# Patient Record
Sex: FEMALE | Race: WHITE | NOT HISPANIC OR LATINO
[De-identification: names, ages, dates, MRNs, and addresses within clinical notes are randomized per-mention and may not be internally consistent; named-entity substitution may affect disease eponyms.]

---

## 2019-03-15 ENCOUNTER — APPOINTMENT (OUTPATIENT)
Dept: COLORECTAL SURGERY | Facility: CLINIC | Age: 29
End: 2019-03-15
Payer: COMMERCIAL

## 2019-03-15 VITALS
DIASTOLIC BLOOD PRESSURE: 96 MMHG | HEART RATE: 89 BPM | OXYGEN SATURATION: 99 % | TEMPERATURE: 98.6 F | SYSTOLIC BLOOD PRESSURE: 149 MMHG | WEIGHT: 143 LBS | HEIGHT: 62 IN | BODY MASS INDEX: 26.31 KG/M2

## 2019-03-15 DIAGNOSIS — K64.4 RESIDUAL HEMORRHOIDAL SKIN TAGS: ICD-10-CM

## 2019-03-15 DIAGNOSIS — Z78.9 OTHER SPECIFIED HEALTH STATUS: ICD-10-CM

## 2019-03-15 PROCEDURE — 46600 DIAGNOSTIC ANOSCOPY SPX: CPT

## 2019-03-15 PROCEDURE — 99243 OFF/OP CNSLTJ NEW/EST LOW 30: CPT | Mod: 25

## 2019-03-16 PROBLEM — K64.4 EXTERNAL HEMORRHOID: Status: ACTIVE | Noted: 2019-03-16

## 2019-03-16 PROBLEM — Z78.9 SOCIAL ALCOHOL USE: Status: ACTIVE | Noted: 2019-03-16

## 2019-03-16 PROBLEM — Z78.9 NON-SMOKER: Status: ACTIVE | Noted: 2019-03-16

## 2019-03-16 RX ORDER — CICLOPIROX 80 MG/ML
8 SOLUTION TOPICAL
Qty: 7 | Refills: 0 | Status: COMPLETED | COMMUNITY
Start: 2019-02-26

## 2019-03-16 NOTE — PHYSICAL EXAM
[Normal] : was normal [None] : there was no rectal mass  [Respiratory Effort] : normal respiratory effort [Normal Rate and Rhythm] : normal rate and rhythm [Calm] : calm [Excoriation] : no perianal excoriation [Gross Blood] : no gross blood [de-identified] : soft, non tender,  non distended. No mass or hernia appreciated [de-identified] : grade 1 internal hemorrhoids [de-identified] : small anterior external hemorrhoid tag [de-identified] : s [de-identified] : well appearing, in no distress [de-identified] : normocephalic, atraumatic [de-identified] : moves extremities without difficulty [de-identified] : warm and dry [de-identified] : alert and oriented x 3

## 2019-03-16 NOTE — HISTORY OF PRESENT ILLNESS
[FreeTextEntry1] : 28 year old female who presents for consultation for hemorrhoids. She has a history of IBS and has alternating constipation and diarrhea. She reports a lump in the perianal area which has been painful especially with loose stools. No current rectal bleeding although she had that in the past.

## 2019-03-16 NOTE — CONSULT LETTER
[Dear  ___] : Dear  [unfilled], [Consult Letter:] : I had the pleasure of evaluating your patient, [unfilled]. [Please see my note below.] : Please see my note below. [Consult Closing:] : Thank you very much for allowing me to participate in the care of this patient.  If you have any questions, please do not hesitate to contact me. [Sincerely,] : Sincerely, [FreeTextEntry3] : Chris Weston MD\par

## 2019-10-04 ENCOUNTER — TRANSCRIPTION ENCOUNTER (OUTPATIENT)
Age: 29
End: 2019-10-04

## 2019-10-23 ENCOUNTER — APPOINTMENT (OUTPATIENT)
Dept: RHEUMATOLOGY | Facility: CLINIC | Age: 29
End: 2019-10-23
Payer: COMMERCIAL

## 2019-10-23 VITALS
RESPIRATION RATE: 14 BRPM | WEIGHT: 137 LBS | DIASTOLIC BLOOD PRESSURE: 90 MMHG | HEIGHT: 62 IN | SYSTOLIC BLOOD PRESSURE: 156 MMHG | BODY MASS INDEX: 25.21 KG/M2 | HEART RATE: 88 BPM | TEMPERATURE: 98.6 F

## 2019-10-23 DIAGNOSIS — Z87.09 PERSONAL HISTORY OF OTHER DISEASES OF THE RESPIRATORY SYSTEM: ICD-10-CM

## 2019-10-23 DIAGNOSIS — R05 COUGH: ICD-10-CM

## 2019-10-23 DIAGNOSIS — R09.81 NASAL CONGESTION: ICD-10-CM

## 2019-10-23 PROCEDURE — 99204 OFFICE O/P NEW MOD 45 MIN: CPT

## 2019-10-24 ENCOUNTER — TRANSCRIPTION ENCOUNTER (OUTPATIENT)
Age: 29
End: 2019-10-24

## 2019-10-28 NOTE — CONSULT LETTER
[Dear  ___] : Dear  [unfilled], [Consult Letter:] : I had the pleasure of evaluating your patient, [unfilled]. [Please see my note below.] : Please see my note below. [Consult Closing:] : Thank you very much for allowing me to participate in the care of this patient.  If you have any questions, please do not hesitate to contact me. [Sincerely,] : Sincerely, [FreeTextEntry2] : Rox Weston [FreeTextEntry3] : Betty Marie MD\par Rheumatology\par [facility] \par \par [address] [place] [address]\par P: [phone]\par F: [phone]\par

## 2019-10-28 NOTE — PHYSICAL EXAM
[General Appearance - Alert] : alert [General Appearance - In No Acute Distress] : in no acute distress [General Appearance - Well Nourished] : well nourished [Sclera] : the sclera and conjunctiva were normal [PERRL With Normal Accommodation] : pupils were equal in size, round, and reactive to light [Extraocular Movements] : extraocular movements were intact [Outer Ear] : the ears and nose were normal in appearance [Both Tympanic Membranes Were Examined] : both tympanic membranes were normal [Nasal Cavity] : the nasal mucosa and septum were normal [Oropharynx] : the oropharynx was normal [Neck Appearance] : the appearance of the neck was normal [Neck Cervical Mass (___cm)] : no neck mass was observed [Thyroid Diffuse Enlargement] : the thyroid was not enlarged [Auscultation Breath Sounds / Voice Sounds] : lungs were clear to auscultation bilaterally [Heart Rate And Rhythm] : heart rate was normal and rhythm regular [Heart Sounds] : normal S1 and S2 [Heart Sounds Gallop] : no gallops [Murmurs] : no murmurs [Heart Sounds Pericardial Friction Rub] : no pericardial rub [Edema] : there was no peripheral edema [Bowel Sounds] : normal bowel sounds [Abdomen Soft] : soft [Abdomen Tenderness] : non-tender [Cervical Lymph Nodes Enlarged Posterior Bilaterally] : posterior cervical [Cervical Lymph Nodes Enlarged Anterior Bilaterally] : anterior cervical [Supraclavicular Lymph Nodes Enlarged Bilaterally] : supraclavicular [No CVA Tenderness] : no ~M costovertebral angle tenderness [No Spinal Tenderness] : no spinal tenderness [Abnormal Walk] : normal gait [Nail Clubbing] : no clubbing  or cyanosis of the fingernails [Musculoskeletal - Swelling] : no joint swelling seen [Motor Tone] : muscle strength and tone were normal [Skin Color & Pigmentation] : normal skin color and pigmentation [Skin Turgor] : normal skin turgor [] : no rash [Motor Exam] : the motor exam was normal [No Focal Deficits] : no focal deficits [Oriented To Time, Place, And Person] : oriented to person, place, and time [Impaired Insight] : insight and judgment were intact [Affect] : the affect was normal [FreeTextEntry1] : No inflammation in nose, ears, posterior pharynx, MMM, no oral ulcers

## 2019-10-28 NOTE — CONSULT LETTER
[Dear  ___] : Dear  [unfilled], [Consult Letter:] : I had the pleasure of evaluating your patient, [unfilled]. [Please see my note below.] : Please see my note below. [Consult Closing:] : Thank you very much for allowing me to participate in the care of this patient.  If you have any questions, please do not hesitate to contact me. [Sincerely,] : Sincerely, [FreeTextEntry2] : Rox Weston [FreeTextEntry3] : Betty Marie MD\par Rheumatology\par [facility] \par \par [address] [place] [address]\par P: [phone]\par F: [phone]\par

## 2019-10-28 NOTE — REASON FOR VISIT
"Called to check on pt. Pt states she is experiencing dizziness, lightheadedness, weakness (unable to walk a long distance), temperature went from 99.5 to 98.0, pt is home alone. Per the pt "it feels like veritgo, just don't have the ringing in ears". Pt advised she will need to go to the ED to be further evaluated or she can be seen today in clinic at the main location. Pt states she will make arrangements and inform clinic.   " [Initial Evaluation] : an initial evaluation [FreeTextEntry1] : cotton wool spot on L retina

## 2019-10-28 NOTE — ASSESSMENT
[FreeTextEntry1] : LIVIER PATTEN is a 29 year old woman who presents with solitary cotton wool spot in L eye in setting of chronic sinus issues over same time course but without prior sinus, lung or renal issues. Initial inflammatory w/u negative, visual disturbance seems to be slowly self-resolving, elevated BP at optho and again there which does contribute to eye findings. Additionally new tattoo placed shortly before this. \par \par - continue workup -- ANCA, ACE, centromere, CCP, SPEP/UPEP, Lyme, HLA B27, EBV, HIV, IgG4, Igs, sjogrens\par - check Quantiferon and CXR given prior LTBI\par - check blood cx and ECHO given recent tattoo as possible emboli phenomenon 2/2 vegetations\par - advised PMD visit to optimize BP \par - optho f/u \par - RTC 1 month

## 2019-10-28 NOTE — HISTORY OF PRESENT ILLNESS
[FreeTextEntry1] : LIVIER PATTEN is a 29 year old woman who presents with solitary cotton wool spot in L eye x 1 month. Spontaneous onset on 9/22/19, which manifested as crescent area of bright light in lower visual field, followed by migraine the next day which resolved but the visual disturbance persisted. Does not have hx of chronic migraines. Saw optho and GP on 9/23/19 and had b/w, f/u with optho again on 10/3/19 for angiogram which was unrevealing, not given any eye drops, next f/u on 11/7. Over same time course has had nonproductive cough, fullness in ears, sinus congestion, sore throat but no F/C or purulent discharge. Initially taking claritin, given medrol dose pack on 10/5 without improvement, followed by cedinir x 7days without improvement. \par \par No chronic sinus issues, no epistaxis, no ocular pressure, no scalp tenderness, no rashes, no dysuria, hematuria, decreased urine, unintentional weight loss approx 8lbs over ?9 months, no change in appetite, No paresthesias, no muscle weakness, F/C, no night sweats. No CP/SOB since cough improved, green sputum at the time. No sick contacts. \par \par SLE ROS negative for alopecia, sicca, salivary gland swelling, oral ulcers, malar rash, photosensitivity, SOB, chest pain, serositis, abd pain, dysuria, hematuria, rash, joint AM stiffness/synovitis, hematologic abnormalities, Raynauds. No pregnancies, no blood clots. \par \par Chronic easy bruising, no blood clots, breakthru bleeding in sept, no change to OCPs, LMP otherwise regular. 2014 -- PPD +, CXR normal, INH x 9 months completed for LTBI -- [place], no awareness of exposure. Reports most recent tattoo shortly before onset of symptoms, not infected, healed well. \par \par Labs: LINDA/SLE w/u negative

## 2019-10-28 NOTE — REVIEW OF SYSTEMS
[Feeling Tired] : feeling tired [Recent Weight Loss (___ Lbs)] : recent [unfilled] ~Ulb weight loss [Earache] : earache [Nasal Discharge] : nasal discharge [Sore Throat] : sore throat [Negative] : Heme/Lymph [As Noted in HPI] : as noted in HPI [Loss Of Hearing] : no hearing loss [Nosebleeds] : no nosebleeds [Hoarseness] : no hoarseness

## 2019-10-28 NOTE — CONSULT LETTER
[Dear  ___] : Dear  [unfilled], [Consult Letter:] : I had the pleasure of evaluating your patient, [unfilled]. [Please see my note below.] : Please see my note below. [Consult Closing:] : Thank you very much for allowing me to participate in the care of this patient.  If you have any questions, please do not hesitate to contact me. [Sincerely,] : Sincerely, [FreeTextEntry2] : Rox Weston [FreeTextEntry3] : Betty Marie MD\par Rheumatology\par [facility] \par \par [address] [place] [address]\par P: [phone]\par F: [phone]\par

## 2019-10-28 NOTE — PHYSICAL EXAM
[General Appearance - Alert] : alert [General Appearance - Well Nourished] : well nourished [General Appearance - In No Acute Distress] : in no acute distress [Sclera] : the sclera and conjunctiva were normal [PERRL With Normal Accommodation] : pupils were equal in size, round, and reactive to light [Extraocular Movements] : extraocular movements were intact [Outer Ear] : the ears and nose were normal in appearance [Both Tympanic Membranes Were Examined] : both tympanic membranes were normal [Nasal Cavity] : the nasal mucosa and septum were normal [Oropharynx] : the oropharynx was normal [Neck Appearance] : the appearance of the neck was normal [Neck Cervical Mass (___cm)] : no neck mass was observed [Thyroid Diffuse Enlargement] : the thyroid was not enlarged [Auscultation Breath Sounds / Voice Sounds] : lungs were clear to auscultation bilaterally [Heart Rate And Rhythm] : heart rate was normal and rhythm regular [Heart Sounds] : normal S1 and S2 [Heart Sounds Gallop] : no gallops [Murmurs] : no murmurs [Heart Sounds Pericardial Friction Rub] : no pericardial rub [Edema] : there was no peripheral edema [Bowel Sounds] : normal bowel sounds [Abdomen Soft] : soft [Abdomen Tenderness] : non-tender [Cervical Lymph Nodes Enlarged Posterior Bilaterally] : posterior cervical [Cervical Lymph Nodes Enlarged Anterior Bilaterally] : anterior cervical [Supraclavicular Lymph Nodes Enlarged Bilaterally] : supraclavicular [No CVA Tenderness] : no ~M costovertebral angle tenderness [No Spinal Tenderness] : no spinal tenderness [Abnormal Walk] : normal gait [Nail Clubbing] : no clubbing  or cyanosis of the fingernails [Musculoskeletal - Swelling] : no joint swelling seen [Motor Tone] : muscle strength and tone were normal [Skin Color & Pigmentation] : normal skin color and pigmentation [Skin Turgor] : normal skin turgor [] : no rash [Motor Exam] : the motor exam was normal [No Focal Deficits] : no focal deficits [Oriented To Time, Place, And Person] : oriented to person, place, and time [Impaired Insight] : insight and judgment were intact [Affect] : the affect was normal [FreeTextEntry1] : No inflammation in nose, ears, posterior pharynx, MMM, no oral ulcers

## 2019-10-28 NOTE — DATA REVIEWED
[FreeTextEntry1] : Available notes, and pertinent labs & imaging in EMR reviewed. Paper records reviewed, scanned to EMR.

## 2019-11-01 ENCOUNTER — TRANSCRIPTION ENCOUNTER (OUTPATIENT)
Age: 29
End: 2019-11-01

## 2019-11-01 ENCOUNTER — RESULT REVIEW (OUTPATIENT)
Age: 29
End: 2019-11-01

## 2019-11-06 ENCOUNTER — TRANSCRIPTION ENCOUNTER (OUTPATIENT)
Age: 29
End: 2019-11-06

## 2019-11-22 ENCOUNTER — APPOINTMENT (OUTPATIENT)
Dept: RHEUMATOLOGY | Facility: CLINIC | Age: 29
End: 2019-11-22
Payer: COMMERCIAL

## 2019-11-22 VITALS — SYSTOLIC BLOOD PRESSURE: 120 MMHG | DIASTOLIC BLOOD PRESSURE: 76 MMHG | HEART RATE: 78 BPM | RESPIRATION RATE: 14 BRPM

## 2019-11-22 PROCEDURE — 99213 OFFICE O/P EST LOW 20 MIN: CPT

## 2019-11-22 NOTE — CONSULT LETTER
[Dear  ___] : Dear  [unfilled], [Consult Letter:] : I had the pleasure of evaluating your patient, [unfilled]. [Please see my note below.] : Please see my note below. [Consult Closing:] : Thank you very much for allowing me to participate in the care of this patient.  If you have any questions, please do not hesitate to contact me. [Sincerely,] : Sincerely, [FreeTextEntry2] : Rox Weston [FreeTextEntry3] : Betty Marie MD\par Rheumatology\par [facility] \par \par [address] [place] [address]\par P: [phone]\par F: [phone]\par   [DrHakeem  ___] : Dr. ABRAHAM

## 2019-11-22 NOTE — ASSESSMENT
[FreeTextEntry1] : LIVIER PATTEN is a 29 year old woman with solitary cotton wool spot in L eye in setting of chronic sinus issues over same time course but without prior sinus, lung or renal issues. Initial inflammatory w/u negative and further testing for inflammatory/rheumatologic etiologies as well as infectious etiologies are all negative. Visual disturbance is slowly self-resolving, no new areas, BP normal today. New Strep infection, now improved. \par \par - discussed all labs/ workup in detail with the patient, no further testing from rheumatologic standpoint at present\par - infectious w/u including CXR, ECHO, Bcx all negative\par - repeat CRP with next routine b/w in Feb 2020 to trend as only lab that was abnormal but this is nonspecific \par - optho f/u \par - RTC prn

## 2019-11-22 NOTE — REVIEW OF SYSTEMS
[As Noted in HPI] : as noted in HPI [Sore Throat] : sore throat [Loss Of Hearing] : no hearing loss [Nosebleeds] : no nosebleeds [Hoarseness] : hoarseness [Negative] : Constitutional

## 2019-11-22 NOTE — HISTORY OF PRESENT ILLNESS
[FreeTextEntry1] : Since last visit, f/u with optho and noted that cotton wool is shrinking, vision has returned to normal, f/u again in Jan 2020. Recent dx of Strep s/p 10 days of Amoxicillin with improvement but with some sore throat/ hoarse voice, but no F/C. No other complaints. \par

## 2020-12-21 PROBLEM — Z87.09 HISTORY OF SORE THROAT: Status: RESOLVED | Noted: 2019-10-23 | Resolved: 2020-12-21

## 2021-01-25 ENCOUNTER — TRANSCRIPTION ENCOUNTER (OUTPATIENT)
Age: 31
End: 2021-01-25

## 2021-02-19 ENCOUNTER — NON-APPOINTMENT (OUTPATIENT)
Age: 31
End: 2021-02-19

## 2021-03-03 ENCOUNTER — NON-APPOINTMENT (OUTPATIENT)
Age: 31
End: 2021-03-03

## 2021-03-03 ENCOUNTER — APPOINTMENT (OUTPATIENT)
Dept: CARDIOLOGY | Facility: CLINIC | Age: 31
End: 2021-03-03
Payer: COMMERCIAL

## 2021-03-03 VITALS
HEIGHT: 62 IN | HEART RATE: 85 BPM | DIASTOLIC BLOOD PRESSURE: 90 MMHG | WEIGHT: 142 LBS | OXYGEN SATURATION: 100 % | SYSTOLIC BLOOD PRESSURE: 140 MMHG | BODY MASS INDEX: 26.13 KG/M2

## 2021-03-03 PROCEDURE — 93000 ELECTROCARDIOGRAM COMPLETE: CPT

## 2021-03-03 PROCEDURE — 99072 ADDL SUPL MATRL&STAF TM PHE: CPT

## 2021-03-03 PROCEDURE — 99244 OFF/OP CNSLTJ NEW/EST MOD 40: CPT | Mod: 25

## 2021-03-03 RX ORDER — OMEPRAZOLE 40 MG/1
40 CAPSULE, DELAYED RELEASE ORAL
Qty: 30 | Refills: 0 | Status: DISCONTINUED | COMMUNITY
Start: 2019-01-21 | End: 2021-03-03

## 2021-03-10 ENCOUNTER — NON-APPOINTMENT (OUTPATIENT)
Age: 31
End: 2021-03-10

## 2021-03-10 ENCOUNTER — TRANSCRIPTION ENCOUNTER (OUTPATIENT)
Age: 31
End: 2021-03-10

## 2021-03-10 NOTE — HISTORY OF PRESENT ILLNESS
[FreeTextEntry1] : Pt is a 29 y/o F who is referred here today by their PCP for evaluation.  She has noticed that her BP has been elevated.  She has been checking BP at home 120's-150's/.  She has noticed that her BP has been elevated  at [facility] for the past 2 years\par She has been on OCP since age 18.\par She states that she is feeling well and has no cardiac complaints - denies CP, SOB, diaphoresis, palpitations, dizziness, syncope, LE edema, PND, orthopnea, flushing. \par COVID vaccine 02/2021\par \par TTE 10/2019 EF 75%, mild TR\par \par PMH: PCOS, latent TB, cotton wool spot \par PCP PA Nya Verduzco\par gyn Dr Chi\par Family hx: mother HLD\par Smoking status: never\par social ETOH\par no drug use\par Current exercise: 4-5x/week cardio, resistance training\par Daily water intake: 60 oz\par Daily caffeine intake: 1 cup black tea\par OTC medications: probiotic, melatonin\par NKDA\par Previous hospitalizations: none\par 0 children\par LMP 02/2021

## 2021-03-10 NOTE — DISCUSSION/SUMMARY
[FreeTextEntry1] : Pt is a 31 y/o F with elevated BP\par Will check transthoracic echocardiogram to evaluate left ventricular function and assess for any structural abnormalities\par HTN: start norvasc 2.5mg qd\par Advised low salt diet, regular exercise\par Also advised to speak with gyn about OCP as cause of HTN\par check renal US\par Pt will return in 2-3 mnths or sooner as needed but I encouraged communication via phone or portal if necessary. \par The described plan was discussed with the pt.  All questions and concerns were addressed to the best of my knowledge.

## 2021-04-06 ENCOUNTER — TRANSCRIPTION ENCOUNTER (OUTPATIENT)
Age: 31
End: 2021-04-06

## 2021-05-19 ENCOUNTER — APPOINTMENT (OUTPATIENT)
Dept: CARDIOLOGY | Facility: CLINIC | Age: 31
End: 2021-05-19
Payer: COMMERCIAL

## 2021-05-19 VITALS
SYSTOLIC BLOOD PRESSURE: 138 MMHG | BODY MASS INDEX: 26.13 KG/M2 | HEART RATE: 85 BPM | DIASTOLIC BLOOD PRESSURE: 82 MMHG | OXYGEN SATURATION: 100 % | WEIGHT: 142 LBS | HEIGHT: 62 IN

## 2021-05-19 PROCEDURE — 99214 OFFICE O/P EST MOD 30 MIN: CPT

## 2021-05-19 PROCEDURE — 99072 ADDL SUPL MATRL&STAF TM PHE: CPT

## 2021-05-19 RX ORDER — AMLODIPINE BESYLATE 2.5 MG/1
2.5 TABLET ORAL DAILY
Qty: 90 | Refills: 1 | Status: DISCONTINUED | COMMUNITY
Start: 2021-03-03 | End: 2021-05-19

## 2021-05-19 NOTE — PHYSICAL EXAM
[Well Developed] : well developed [Well Nourished] : well nourished [No Acute Distress] : no acute distress [Normal Conjunctiva] : normal conjunctiva [Normal Venous Pressure] : normal venous pressure [No Carotid Bruit] : no carotid bruit [No Murmur] : no murmur [Normal S1, S2] : normal S1, S2 [No Rub] : no rub [No Gallop] : no gallop [Clear Lung Fields] : clear lung fields [Good Air Entry] : good air entry [No Respiratory Distress] : no respiratory distress  [Soft] : abdomen soft [Non Tender] : non-tender [No Masses/organomegaly] : no masses/organomegaly [Normal Bowel Sounds] : normal bowel sounds [Normal Gait] : normal gait [No Edema] : no edema [No Cyanosis] : no cyanosis [No Clubbing] : no clubbing [No Varicosities] : no varicosities [No Rash] : no rash [No Skin Lesions] : no skin lesions [Moves all extremities] : moves all extremities [No Focal Deficits] : no focal deficits [Normal Speech] : normal speech [Alert and Oriented] : alert and oriented [Normal memory] : normal memory

## 2021-05-21 NOTE — DISCUSSION/SUMMARY
[FreeTextEntry1] : Pt is a 29 y/o F with HTN\par \par HTN: \par stop norvasc and start HCTZ\par TTE 10/2019 EF 75%, mild TR\par TTE 04/2021 EF 67%, mild TR\par VINNY 04/2021 no stenosis\par Advised low salt diet, regular exercise\par \par Pt will return in 2-3 mnths or sooner as needed but I encouraged communication via phone or portal if necessary. \par The described plan was discussed with the pt.  All questions and concerns were addressed to the best of my knowledge.

## 2021-05-21 NOTE — HISTORY OF PRESENT ILLNESS
[FreeTextEntry1] : Pt is a 29 y/o F who presents today for f/u.  She has PMH HTN.  She has been on OCP since age 18 - she has taken her last dose yesterday.  She has been taking amlodipine but this is causing constipation\par She states that she is feeling well and has no cardiac complaints - denies CP, SOB, diaphoresis, palpitations, dizziness, syncope, LE edema, PND, orthopnea, flushing. \par COVID vaccine 02/2021\par \par TTE 10/2019 EF 75%, mild TR\par TTE 04/2021 EF 67%, mild TR\par VINNY 04/2021 no stenosis\par \par amlodipine caused constipation\par \par PMH: PCOS, latent TB, cotton wool spot \par PCP PA Nya Verduzco\par gyn Dr Chi\par Family hx: mother HLD\par Smoking status: never\par social ETOH\par no drug use\par Current exercise: 4-5x/week cardio, resistance training\par Daily water intake: 60 oz\par Daily caffeine intake: 1 cup black tea\par OTC medications: probiotic, melatonin\par NKDA\par Previous hospitalizations: none\par 0 children\par LMP 02/2021

## 2021-08-07 PROBLEM — Z82.49 FAMILY HISTORY OF HYPERTENSION: Status: ACTIVE | Noted: 2021-08-07

## 2021-08-07 PROBLEM — Z83.42 FAMILY HISTORY OF HYPERCHOLESTEROLEMIA: Status: ACTIVE | Noted: 2021-08-07

## 2021-08-07 PROBLEM — Z92.89 HISTORY OF POSITIVE PPD: Status: RESOLVED | Noted: 2021-08-07 | Resolved: 2021-08-07

## 2021-08-09 ENCOUNTER — NON-APPOINTMENT (OUTPATIENT)
Age: 31
End: 2021-08-09

## 2021-08-10 ENCOUNTER — LABORATORY RESULT (OUTPATIENT)
Age: 31
End: 2021-08-10

## 2021-08-10 ENCOUNTER — APPOINTMENT (OUTPATIENT)
Dept: FAMILY MEDICINE | Facility: CLINIC | Age: 31
End: 2021-08-10
Payer: COMMERCIAL

## 2021-08-10 VITALS
OXYGEN SATURATION: 99 % | DIASTOLIC BLOOD PRESSURE: 74 MMHG | HEART RATE: 70 BPM | TEMPERATURE: 97.5 F | SYSTOLIC BLOOD PRESSURE: 132 MMHG | BODY MASS INDEX: 27.97 KG/M2 | WEIGHT: 152 LBS | HEIGHT: 62 IN

## 2021-08-10 DIAGNOSIS — Z83.42 FAMILY HISTORY OF FAMILIAL HYPERCHOLESTEROLEMIA: ICD-10-CM

## 2021-08-10 DIAGNOSIS — R20.2 PARESTHESIA OF SKIN: ICD-10-CM

## 2021-08-10 DIAGNOSIS — Z92.89 PERSONAL HISTORY OF OTHER MEDICAL TREATMENT: ICD-10-CM

## 2021-08-10 DIAGNOSIS — Z82.49 FAMILY HISTORY OF ISCHEMIC HEART DISEASE AND OTHER DISEASES OF THE CIRCULATORY SYSTEM: ICD-10-CM

## 2021-08-10 PROCEDURE — 36415 COLL VENOUS BLD VENIPUNCTURE: CPT

## 2021-08-10 PROCEDURE — 99213 OFFICE O/P EST LOW 20 MIN: CPT | Mod: 25

## 2021-08-10 NOTE — HISTORY OF PRESENT ILLNESS
[FreeTextEntry1] : LIVIER PATTEN is a 31 year old female here for a follow up visit.\par  [de-identified] : Sahara is here for f/u HTN and also to discuss her concerns about possible insulin resistance/would like labs checked. \par \par She has been seeing card Dr. Camacho for eval/mgmt HTN. Amlodipine caused constipation so she was switched to HCTZ. ALso stopped her OCPs. Has card f/u next week. Bps are now well controlled on HCTZ (110s/70s on home checks). \par \par She has h/o PCOS. She is UTD on gyn f/u with Dr. Delgado. Her PCOS flared up once she stopped OCPs in early 2021. Had secondary amenorrhea and increased hirsutism and increased acne and wgt gain despite continued clean eating/regular exercise. She followed up with gyn and restarted OCPs a few wks ago (too soon to say if helping) to treat sxs now that her BPs are well controlled. \par \par A1C was 5.1 in 2019. Glucoses have been wnl when we have checked at PE visits in the past. Lipids wnl 2/2021. \par \par She eats cleanly overall (mostly plant based/trending towards vegan but not strictly) and exercises 5 days a week. \par \par She has h/o elevated CRP (18.8 in 2019) and would like this rechecked. Also wants to check glucose and HgbA1C. Would like TG levels checked today also. She is fasting today. \par \par Also notes tingling in B legs (entire length) after sitting for prolonged time or sitting cross legged over past few months. Resolves once she gets up and moves around. No back pain. Used to do yoga but since pandemic and move she has not been able to as has not found an open yoga studio nearby but is going to try to do so now.

## 2021-08-10 NOTE — REVIEW OF SYSTEMS
[Negative] : Psychiatric [Fever] : no fever [Chills] : no chills [Fatigue] : no fatigue [Recent Change In Weight] : ~T no recent weight change [Chest Pain] : no chest pain [Palpitations] : no palpitations [Lower Ext Edema] : no lower extremity edema [Shortness Of Breath] : no shortness of breath [Wheezing] : no wheezing [Cough] : no cough [Dyspnea on Exertion] : no dyspnea on exertion [Abdominal Pain] : no abdominal pain [Diarrhea] : diarrhea [Vomiting] : no vomiting [Joint Pain] : no joint pain [Joint Stiffness] : no joint stiffness [Skin Rash] : no skin rash [Headache] : no headache [Dizziness] : no dizziness [Fainting] : no fainting

## 2021-08-10 NOTE — PLAN
[FreeTextEntry1] : Check fasting labs today. Cont same meds pending results. BP at goal on home checks (mildly elevated SBP in office today likely due to being medical setting). \par \par Cont clean eating and regular exercise/staying as physically active as possible.Try adding back in yoga/stretching and get up and move around more freq when sitting/doing computer work to help decrease LE tingling that seems positional. Reassurance offered that her exam is normal. \par \par Reviewed importance of good self care (eg meditation, yoga, adequate rest, regular exercise, magnesium, clean eating etc).\par \par F/u with card as recommended. \par \par Last PE was 2/2021 so can sched next visit here as PE or can defer on PE until 2023 if she prefers. RTO 6 mos for HTN f/u visit (unless seeing card at that time).

## 2021-08-10 NOTE — PHYSICAL EXAM
[No Acute Distress] : no acute distress [Well Developed] : well developed [Well-Appearing] : well-appearing [Normal Sclera/Conjunctiva] : normal sclera/conjunctiva [EOMI] : extraocular movements intact [No JVD] : no jugular venous distention [No Lymphadenopathy] : no lymphadenopathy [Supple] : supple [Thyroid Normal, No Nodules] : the thyroid was normal and there were no nodules present [No Respiratory Distress] : no respiratory distress  [No Accessory Muscle Use] : no accessory muscle use [Clear to Auscultation] : lungs were clear to auscultation bilaterally [Normal Rate] : normal rate  [Regular Rhythm] : with a regular rhythm [Normal S1, S2] : normal S1 and S2 [No Murmur] : no murmur heard [No Varicosities] : no varicosities [Pedal Pulses Present] : the pedal pulses are present [No Edema] : there was no peripheral edema [No Extremity Clubbing/Cyanosis] : no extremity clubbing/cyanosis [Soft] : abdomen soft [Non Tender] : non-tender [Non-distended] : non-distended [No Masses] : no abdominal mass palpated [No HSM] : no HSM [Normal Bowel Sounds] : normal bowel sounds [No Joint Swelling] : no joint swelling [Grossly Normal Strength/Tone] : grossly normal strength/tone [No Rash] : no rash [Coordination Grossly Intact] : coordination grossly intact [No Focal Deficits] : no focal deficits [Normal Gait] : normal gait [Normal Affect] : the affect was normal [Normal Insight/Judgement] : insight and judgment were intact [No Spinal Tenderness] : no spinal tenderness [Deep Tendon Reflexes (DTR)] : deep tendon reflexes were 2+ and symmetric [de-identified] : mildly anxious in medical setting, mildly overweight [de-identified] : no SI TTp , no paraspinal mm TTP [de-identified] : 5/5 BLE motor strength [de-identified] : 2+ B patellar DTRs

## 2021-08-14 ENCOUNTER — TRANSCRIPTION ENCOUNTER (OUTPATIENT)
Age: 31
End: 2021-08-14

## 2021-08-14 LAB
ANION GAP SERPL CALC-SCNC: 12 MMOL/L
BUN SERPL-MCNC: 8 MG/DL
CALCIUM SERPL-MCNC: 9.1 MG/DL
CHLORIDE SERPL-SCNC: 100 MMOL/L
CO2 SERPL-SCNC: 23 MMOL/L
CREAT SERPL-MCNC: 0.85 MG/DL
CRP SERPL-MCNC: 8 MG/L
ESTIMATED AVERAGE GLUCOSE: 105 MG/DL
GLUCOSE SERPL-MCNC: 100 MG/DL
HBA1C MFR BLD HPLC: 5.3 %
POTASSIUM SERPL-SCNC: 3.4 MMOL/L
SODIUM SERPL-SCNC: 135 MMOL/L
TRIGL SERPL-MCNC: 172 MG/DL
TSH SERPL-ACNC: 4.26 UIU/ML
VIT B12 SERPL-MCNC: 778 PG/ML

## 2021-08-18 ENCOUNTER — APPOINTMENT (OUTPATIENT)
Dept: CARDIOLOGY | Facility: CLINIC | Age: 31
End: 2021-08-18
Payer: COMMERCIAL

## 2021-08-18 VITALS
DIASTOLIC BLOOD PRESSURE: 84 MMHG | HEART RATE: 84 BPM | BODY MASS INDEX: 27.97 KG/M2 | HEIGHT: 62 IN | SYSTOLIC BLOOD PRESSURE: 118 MMHG | WEIGHT: 152 LBS | OXYGEN SATURATION: 100 %

## 2021-08-18 PROCEDURE — 99213 OFFICE O/P EST LOW 20 MIN: CPT

## 2021-08-18 NOTE — HISTORY OF PRESENT ILLNESS
[FreeTextEntry1] : Pt is a 30 y/o F who presents today for f/u.  She has PMH HTN.  She has been on OCP since age 18 - she tried to come off OCPs but her PCOS symptoms were bad and so she has restarted OCP.  Red HCTZ her BP has improved\par She states that she is feeling well and has no cardiac complaints - denies CP, SOB, diaphoresis, palpitations, dizziness, syncope, LE edema, PND, orthopnea, flushing. \par Home 's/70's\par COVID vaccine 02/2021\par \par TTE 10/2019 EF 75%, mild TR\par TTE 04/2021 EF 67%, mild TR\par VINNY 04/2021 no stenosis\par \par amlodipine caused constipation\par \par PMH: PCOS, latent TB, cotton wool spot \par PCP PA Nya Verduzco\par gyn Dr Chi\par Family hx: mother HLD\par Smoking status: never\par social ETOH\par no drug use\par Current exercise: 4-5x/week cardio, resistance training\par Daily water intake: 60 oz\par Daily caffeine intake: 1 cup black tea\par OTC medications: probiotic, melatonin\par NKDA\par Previous hospitalizations: none\par 0 children\par

## 2021-08-18 NOTE — DISCUSSION/SUMMARY
[FreeTextEntry1] : Pt is a 31 y/o F with HTN\par \par HTN: \par well controlled\par c/w HCTZ\par TTE 10/2019 EF 75%, mild TR\par TTE 04/2021 EF 67%, mild TR\par VINNY 04/2021 no stenosis\par Advised low salt diet, regular exercise\par \par Pt will return in 6-12 mnths or sooner as needed but I encouraged communication via phone or portal if necessary. \par The described plan was discussed with the pt.  All questions and concerns were addressed to the best of my knowledge.

## 2021-08-19 ENCOUNTER — RESULT CHARGE (OUTPATIENT)
Age: 31
End: 2021-08-19

## 2021-08-23 ENCOUNTER — TRANSCRIPTION ENCOUNTER (OUTPATIENT)
Age: 31
End: 2021-08-23

## 2021-09-03 ENCOUNTER — TRANSCRIPTION ENCOUNTER (OUTPATIENT)
Age: 31
End: 2021-09-03

## 2021-09-03 DIAGNOSIS — M54.5 LOW BACK PAIN: ICD-10-CM

## 2021-09-22 ENCOUNTER — TRANSCRIPTION ENCOUNTER (OUTPATIENT)
Age: 31
End: 2021-09-22

## 2021-10-12 ENCOUNTER — TRANSCRIPTION ENCOUNTER (OUTPATIENT)
Age: 31
End: 2021-10-12

## 2021-11-16 ENCOUNTER — TRANSCRIPTION ENCOUNTER (OUTPATIENT)
Age: 31
End: 2021-11-16

## 2022-03-22 ENCOUNTER — APPOINTMENT (OUTPATIENT)
Dept: FAMILY MEDICINE | Facility: CLINIC | Age: 32
End: 2022-03-22
Payer: COMMERCIAL

## 2022-03-22 VITALS
BODY MASS INDEX: 27.6 KG/M2 | TEMPERATURE: 97.9 F | DIASTOLIC BLOOD PRESSURE: 80 MMHG | HEART RATE: 80 BPM | SYSTOLIC BLOOD PRESSURE: 188 MMHG | HEIGHT: 62 IN | OXYGEN SATURATION: 98 % | WEIGHT: 150 LBS

## 2022-03-22 DIAGNOSIS — M79.10 MYALGIA, UNSPECIFIED SITE: ICD-10-CM

## 2022-03-22 DIAGNOSIS — U07.1 COVID-19: ICD-10-CM

## 2022-03-22 DIAGNOSIS — L65.9 NONSCARRING HAIR LOSS, UNSPECIFIED: ICD-10-CM

## 2022-03-22 DIAGNOSIS — M25.50 PAIN IN UNSPECIFIED JOINT: ICD-10-CM

## 2022-03-22 PROCEDURE — G0444 DEPRESSION SCREEN ANNUAL: CPT | Mod: NC,59

## 2022-03-22 PROCEDURE — 36415 COLL VENOUS BLD VENIPUNCTURE: CPT

## 2022-03-22 PROCEDURE — 99395 PREV VISIT EST AGE 18-39: CPT | Mod: 25

## 2022-03-22 RX ORDER — FLUTICASONE PROPIONATE 50 UG/1
50 SPRAY, METERED NASAL
Refills: 0 | Status: ACTIVE | COMMUNITY

## 2022-03-22 RX ORDER — MAGNESIUM OXIDE 241.3 MG/1000MG
400 TABLET ORAL
Refills: 0 | Status: ACTIVE | COMMUNITY

## 2022-03-22 RX ORDER — CHLORHEXIDINE GLUCONATE 4 %
LIQUID (ML) TOPICAL
Refills: 0 | Status: ACTIVE | COMMUNITY

## 2022-03-22 RX ORDER — CHOLINE 650 MG
TABLET ORAL
Refills: 0 | Status: ACTIVE | COMMUNITY

## 2022-03-22 NOTE — HISTORY OF PRESENT ILLNESS
[de-identified] : Her last PE was in 2/2021\par \par Her last tetanus shot was 3/15/2016 Tdap\par Had COVID vacc x 3 doses and also had COVID infection 12/2021 and was mild overall and has recovered from this\par \par Her last dentist visit was within past 6 months\par Her last eye doctor appointment was within past year (Dr. Anselmo Dodge)\par \par Her diet is clean/healthful overall- saw RD in past year and tried FODMaps diet but did not help IBS sxs so now just eats cleanly and is mainly plant based. Takes MVI with B12 since is plant based\par Exercises regularly- walking, wgt training\par \par Her last colonoscopy was 4/2015 possible proctitis-- now seeing Dr. Peter-- has appt 4/2022 for colonoscopy and upper endoscopy\par Her last mammogram was 1/2019 benign US\par Her last gyn exam was 12/2021 Dr. Delgado\par \par [facility] is here for f/u HTN and also to discuss her concerns about possible insulin resistance/would like labs checked today incl glu, A1c and wants thyroid test checked too. Also has IBS C with bloating and PCOS \par \par She has been seeing card Dr. Camacho for eval/mgmt HTN. Amlodipine caused constipation so she was switched to HCTZ. Also stopped her OCPs for a while but her PCOS flared up off OCPS and once BPs were under better control she was able to restart OCP.  Bps are well controlled on periodic home checks. \par \par She has h/o PCOS. She is UTD on gyn f/u with Dr. Delgado. Her PCOS flared up once she stopped OCPs in early 2021. Had secondary amenorrhea and increased hirsutism and increased acne and wgt gain despite continued clean eating/regular exercise. She is back on OCPs since 8/2021 and is helping with PCOS sxs but has been having intermenstrual bleeding/spotting and heavy flow last month. Wants thyroid level checked. Will f/u with gyn if needed if TSH is wnl and the spotting/bleeding btwn periods continues \par \par She eats cleanly overall (mostly plant based/trending towards vegan but not strictly) and exercises 5 days a week. \par \par She has h/o elevated CRP and would like this rechecked. Also wants to check glucose and HgbA1C due to h/o insulin resistance (A1C wnl on most recent check). Would like TG levels checked today also. She is fasting today. \par \par She last saw Dr. Marie in 2019 (for cotton wool spot, joint pain, elevated CRP) and she eval was benign and was told can f/u prn \par \par Medical massage has been helpful for upper back pain and R hip and other muscle/joint pains in the past. Needs Rx to continue through insurance plan (covered under her plan with Rx) a\par \par Saw allergist in [place] in 12/2021 for tingling lips with certain foods. testing for foods was all wnl. +dust miter allergy. Fluticasone nasal spray has helped a bit with ssx

## 2022-03-22 NOTE — PHYSICAL EXAM
[No Acute Distress] : no acute distress [Well Developed] : well developed [Well-Appearing] : well-appearing [Normal Sclera/Conjunctiva] : normal sclera/conjunctiva [EOMI] : extraocular movements intact [No Lymphadenopathy] : no lymphadenopathy [No JVD] : no jugular venous distention [Supple] : supple [Thyroid Normal, No Nodules] : the thyroid was normal and there were no nodules present [No Respiratory Distress] : no respiratory distress  [No Accessory Muscle Use] : no accessory muscle use [Normal Rate] : normal rate  [Clear to Auscultation] : lungs were clear to auscultation bilaterally [Regular Rhythm] : with a regular rhythm [Normal S1, S2] : normal S1 and S2 [No Murmur] : no murmur heard [No Varicosities] : no varicosities [Pedal Pulses Present] : the pedal pulses are present [No Edema] : there was no peripheral edema [No Extremity Clubbing/Cyanosis] : no extremity clubbing/cyanosis [Soft] : abdomen soft [Non Tender] : non-tender [Non-distended] : non-distended [No Masses] : no abdominal mass palpated [No HSM] : no HSM [Normal Bowel Sounds] : normal bowel sounds [No Joint Swelling] : no joint swelling [Grossly Normal Strength/Tone] : grossly normal strength/tone [No Rash] : no rash [Coordination Grossly Intact] : coordination grossly intact [No Focal Deficits] : no focal deficits [Normal Gait] : normal gait [Normal Affect] : the affect was normal [Normal Insight/Judgement] : insight and judgment were intact [de-identified] : mildly anxious in medical setting, mildly overweight

## 2022-03-22 NOTE — REVIEW OF SYSTEMS
[Negative] : Heme/Lymph [Hair Changes] : hair changes [Abdominal Pain] : abdominal pain [Constipation] : constipation [Joint Pain] : joint pain [Muscle Pain] : muscle pain [Lower Ext Edema] : no lower extremity edema [Diarrhea] : diarrhea [Vomiting] : no vomiting [Melena] : no melena [Itching] : no itching [Mole Changes] : no mole changes [Nail Changes] : no nail changes [Skin Rash] : no skin rash [FreeTextEntry7] : IBS-C with bloating, seeing Dr. Peter and has colonoscopy sched in 4/2022 [FreeTextEntry9] : see HPI, medical massage helps [de-identified] : increased hair loss

## 2022-03-22 NOTE — PLAN
[FreeTextEntry1] : Reviewed age-appropriate preventive screening tests with patient. UTD on gyn exam. Due for repeat colonoscopy . UTD on mammo/breast screening and can d/w gyn when to plan next mammo (usually not until age 40-50 but she can check with gyn re if should consider doing sooner). Has colonoscopy sched for 4/2022 and will do EGD then also. Is UTD on ECG as had with card in 2021\par \par Check labs today. Cont same meds pending results. BP at goal on home checks (mildly elevated SBP in office today likely due to being medical setting). \par \par Discussed clean eating (eg Mediterranean style eating plan) and regular exercise/staying as physically active as possible.Try adding back in yoga \par \par Reviewed importance of good self care (eg meditation, yoga, adequate rest, regular exercise, magnesium, clean eating etc).\par \par F/u with card annually and prn as recommended. Also recommended she consider f/u eval with rheum (Dr. Marie) if CRP is again elevated to see if any additional rheum testing is needed at this time. She will cont clean eating/regular exercise/stress reduction techniques to help minimize inflammation in her body. \par \par Next CPE in 1-3 yrs. Next RPA visit TBD pending labs.

## 2022-03-22 NOTE — HEALTH RISK ASSESSMENT
[0] : 2) Feeling down, depressed, or hopeless: Not at all (0) [PHQ-2 Negative - No further assessment needed] : PHQ-2 Negative - No further assessment needed [[id number]] : 0

## 2022-03-22 NOTE — ASSESSMENT
[FreeTextEntry1] : LIVIER PATTEN is a 31 year old female here for a physical exam.  She is also here to follow up on medical issues as noted above.\par

## 2022-03-24 ENCOUNTER — TRANSCRIPTION ENCOUNTER (OUTPATIENT)
Age: 32
End: 2022-03-24

## 2022-03-24 LAB
ALBUMIN SERPL ELPH-MCNC: 4.5 G/DL
ALP BLD-CCNC: 39 U/L
ALT SERPL-CCNC: 37 U/L
ANION GAP SERPL CALC-SCNC: 13 MMOL/L
AST SERPL-CCNC: 32 U/L
BILIRUB SERPL-MCNC: 0.4 MG/DL
BUN SERPL-MCNC: 8 MG/DL
CALCIUM SERPL-MCNC: 9.6 MG/DL
CHLORIDE SERPL-SCNC: 100 MMOL/L
CHOLEST SERPL-MCNC: 195 MG/DL
CO2 SERPL-SCNC: 24 MMOL/L
CREAT SERPL-MCNC: 0.77 MG/DL
CRP SERPL-MCNC: 11 MG/L
EGFR: 107 ML/MIN/1.73M2
ESTIMATED AVERAGE GLUCOSE: 105 MG/DL
FERRITIN SERPL-MCNC: 40 NG/ML
GLUCOSE SERPL-MCNC: 81 MG/DL
HBA1C MFR BLD HPLC: 5.3 %
HDLC SERPL-MCNC: 56 MG/DL
IRON SATN MFR SERPL: 39 %
IRON SERPL-MCNC: 119 UG/DL
LDLC SERPL CALC-MCNC: 118 MG/DL
NONHDLC SERPL-MCNC: 139 MG/DL
POTASSIUM SERPL-SCNC: 3.7 MMOL/L
PROT SERPL-MCNC: 6.9 G/DL
SODIUM SERPL-SCNC: 138 MMOL/L
T4 FREE SERPL-MCNC: 1.2 NG/DL
TIBC SERPL-MCNC: 305 UG/DL
TRIGL SERPL-MCNC: 103 MG/DL
TSH SERPL-ACNC: 3.45 UIU/ML
UIBC SERPL-MCNC: 186 UG/DL

## 2022-04-05 ENCOUNTER — RX RENEWAL (OUTPATIENT)
Age: 32
End: 2022-04-05

## 2022-04-18 DIAGNOSIS — K22.10 ULCER OF ESOPHAGUS W/OUT BLEEDING: ICD-10-CM

## 2022-04-28 PROBLEM — K22.10 EROSIVE ESOPHAGITIS: Status: ACTIVE | Noted: 2022-04-28

## 2022-05-31 ENCOUNTER — FORM ENCOUNTER (OUTPATIENT)
Age: 32
End: 2022-05-31

## 2022-06-06 ENCOUNTER — NON-APPOINTMENT (OUTPATIENT)
Age: 32
End: 2022-06-06

## 2022-06-09 ENCOUNTER — FORM ENCOUNTER (OUTPATIENT)
Age: 32
End: 2022-06-09

## 2022-06-28 ENCOUNTER — TRANSCRIPTION ENCOUNTER (OUTPATIENT)
Age: 32
End: 2022-06-28

## 2022-06-28 ENCOUNTER — RX RENEWAL (OUTPATIENT)
Age: 32
End: 2022-06-28

## 2022-07-10 ENCOUNTER — FORM ENCOUNTER (OUTPATIENT)
Age: 32
End: 2022-07-10

## 2022-07-14 ENCOUNTER — FORM ENCOUNTER (OUTPATIENT)
Age: 32
End: 2022-07-14

## 2022-08-01 ENCOUNTER — APPOINTMENT (OUTPATIENT)
Dept: CARDIOLOGY | Facility: CLINIC | Age: 32
End: 2022-08-01

## 2022-08-04 ENCOUNTER — APPOINTMENT (OUTPATIENT)
Dept: CARDIOLOGY | Facility: CLINIC | Age: 32
End: 2022-08-04

## 2022-08-04 ENCOUNTER — NON-APPOINTMENT (OUTPATIENT)
Age: 32
End: 2022-08-04

## 2022-08-04 VITALS
BODY MASS INDEX: 27.97 KG/M2 | DIASTOLIC BLOOD PRESSURE: 82 MMHG | SYSTOLIC BLOOD PRESSURE: 124 MMHG | OXYGEN SATURATION: 96 % | HEART RATE: 105 BPM | HEIGHT: 62 IN | WEIGHT: 152 LBS

## 2022-08-04 PROCEDURE — 99214 OFFICE O/P EST MOD 30 MIN: CPT | Mod: 25

## 2022-08-04 PROCEDURE — 93000 ELECTROCARDIOGRAM COMPLETE: CPT

## 2022-08-04 RX ORDER — DESOGESTREL AND ETHINYL ESTRADIOL AND ETHINYL ESTRADIOL 21-5 (28)
0.15-0.02/0.01 KIT ORAL
Refills: 0 | Status: ACTIVE | COMMUNITY

## 2022-08-04 RX ORDER — DESOGESTREL AND ETHINYL ESTRADIOL AND ETHINYL ESTRADIOL 21-5 (28)
0.15-0.02/0.01 KIT ORAL
Qty: 84 | Refills: 0 | Status: DISCONTINUED | COMMUNITY
Start: 2019-01-21 | End: 2022-08-04

## 2022-08-04 NOTE — DISCUSSION/SUMMARY
[FreeTextEntry1] : Pt is a 31 y/o F with HTN\par \par HTN: \par diastolic BP not well controlled\par change HCTZ to chlorthalidone\par TTE 10/2019 EF 75%, mild TR\par TTE 04/2021 EF 67%, mild TR\par VINNY 04/2021 no stenosis\par Advised low salt diet, regular exercise\par recent labs reviewed\par \par Pt will return in 4-6 mnths or sooner as needed but I encouraged communication via phone or portal if necessary. \par The described plan was discussed with the pt.  All questions and concerns were addressed to the best of my knowledge.  [EKG obtained to assist in diagnosis and management of assessed problem(s)] : EKG obtained to assist in diagnosis and management of assessed problem(s)

## 2022-08-04 NOTE — HISTORY OF PRESENT ILLNESS
[FreeTextEntry1] : Pt is a 33 y/o F who presents today for f/u.  She has PMH HTN, Hashimoto now on levothyroxine.  She has been on OCP since age 18 - she tried to come off OCPs but her PCOS symptoms were bad and so she has restarted OCP.  \par She states that she is feeling well and has no cardiac complaints - denies CP, SOB, diaphoresis, palpitations, dizziness, syncope, LE edema, PND, orthopnea, flushing. \par Home 's/80-90's\par COVID vaccine 02/2021\par \par TTE 10/2019 EF 75%, mild TR\par TTE 04/2021 EF 67%, mild TR\par VINNY 04/2021 no stenosis\par \par amlodipine caused constipation\par \par PMH: PCOS, latent TB, cotton wool spot, Hashimoto\par PCP PA Nya Verduzco\par gyn Dr Chi\par Family hx: mother HLD\par Smoking status: never\par social ETOH\par no drug use\par Current exercise: 4-5x/week cardio, resistance training\par Daily water intake: 60 oz\par Daily caffeine intake: 1 cup black tea\par OTC medications: probiotic, melatonin\par NKDA\par Previous hospitalizations: none\par 0 children\par

## 2022-09-07 ENCOUNTER — TRANSCRIPTION ENCOUNTER (OUTPATIENT)
Age: 32
End: 2022-09-07

## 2022-09-28 ENCOUNTER — APPOINTMENT (OUTPATIENT)
Dept: RHEUMATOLOGY | Facility: CLINIC | Age: 32
End: 2022-09-28

## 2022-09-28 VITALS
BODY MASS INDEX: 27.42 KG/M2 | HEIGHT: 62 IN | DIASTOLIC BLOOD PRESSURE: 80 MMHG | HEART RATE: 82 BPM | WEIGHT: 149 LBS | OXYGEN SATURATION: 99 % | SYSTOLIC BLOOD PRESSURE: 118 MMHG | RESPIRATION RATE: 16 BRPM | TEMPERATURE: 97.5 F

## 2022-09-28 PROCEDURE — 99214 OFFICE O/P EST MOD 30 MIN: CPT

## 2022-09-28 NOTE — ASSESSMENT
What Type Of Note Output Would You Prefer (Optional)?: Standard Output [FreeTextEntry1] : LIVIER PATTEN is a 32 year old woman with solitary cotton wool spot in L eye x 2 now, both with spontanous resolution and no other inflammatory findings on eye exam per review of notes, persistent CRP elevation but remainder of extensive serological w/u in 2019 and partial repeat in 2022 negative for a systemic rheum etiology tho recently was dx with Hashimotos which is autoimmune. Remains with paucity of systemic inflammatory sx of lupus, inflammatory arthritis, myositis, vasculitis, RP, Behcets. ? isolated ocular vasculitis tho odd to spontaneously resolve. \par \par - above discussed extensively with her\par - discussed that CRP is nonspecific and no role to empirically treat \par - reviewed other rheum w/u with her, will trend ESR/CRP and repeat some of the prior serologies I had tested for as well as a few others\par - discussed however even if any positive would not treat unless cotton wool returns and is determined to be inflammatory - ie requires steroids topically or PO or + angiogram for vasculitis. Discussed with her prompt optho re-eval if recurs and to see if an angiogram can be done at that time. If + then prompt rheum f/u.  How Severe Is Your Rash?: mild Is This A New Presentation, Or A Follow-Up?: Rash

## 2022-09-28 NOTE — CONSULT LETTER
[Consult Letter:] : I had the pleasure of evaluating your patient, [unfilled]. [Please see my note below.] : Please see my note below. [Consult Closing:] : Thank you very much for allowing me to participate in the care of this patient.  If you have any questions, please do not hesitate to contact me. [Sincerely,] : Sincerely, [Dear  ___] : Dear  [unfilled], [FreeTextEntry2] : Dr Cruz Martínez  [FreeTextEntry3] : Betty Marie MD\par Rheumatology\par [facility] \par \par [address] [place] [address]\par P: [phone]\par F: [phone]\par

## 2022-09-28 NOTE — PHYSICAL EXAM
[General Appearance - Alert] : alert [General Appearance - In No Acute Distress] : in no acute distress [General Appearance - Well Nourished] : well nourished [Sclera] : the sclera and conjunctiva were normal [PERRL With Normal Accommodation] : pupils were equal in size, round, and reactive to light [Extraocular Movements] : extraocular movements were intact [Outer Ear] : the ears and nose were normal in appearance [Nasal Cavity] : the nasal mucosa and septum were normal [Oropharynx] : the oropharynx was normal [Neck Appearance] : the appearance of the neck was normal [Neck Cervical Mass (___cm)] : no neck mass was observed [Auscultation Breath Sounds / Voice Sounds] : lungs were clear to auscultation bilaterally [Heart Rate And Rhythm] : heart rate was normal and rhythm regular [Heart Sounds] : normal S1 and S2 [Heart Sounds Gallop] : no gallops [Murmurs] : no murmurs [Heart Sounds Pericardial Friction Rub] : no pericardial rub [Edema] : there was no peripheral edema [Bowel Sounds] : normal bowel sounds [Abdomen Soft] : soft [Abdomen Tenderness] : non-tender [Cervical Lymph Nodes Enlarged Posterior Bilaterally] : posterior cervical [Cervical Lymph Nodes Enlarged Anterior Bilaterally] : anterior cervical [Supraclavicular Lymph Nodes Enlarged Bilaterally] : supraclavicular [No CVA Tenderness] : no ~M costovertebral angle tenderness [No Spinal Tenderness] : no spinal tenderness [Abnormal Walk] : normal gait [Nail Clubbing] : no clubbing  or cyanosis of the fingernails [Musculoskeletal - Swelling] : no joint swelling seen [Motor Tone] : muscle strength and tone were normal [] : no rash [Motor Exam] : the motor exam was normal [No Focal Deficits] : no focal deficits [Oriented To Time, Place, And Person] : oriented to person, place, and time [Impaired Insight] : insight and judgment were intact [Affect] : the affect was normal [FreeTextEntry1] : No synovitis, R hip and knee ROM intact

## 2022-09-28 NOTE — HISTORY OF PRESENT ILLNESS
[FreeTextEntry1] : LIVIER PATTEN is a 29 year old woman who presents with solitary cotton wool spot in L eye x 1 month. Spontaneous onset on 9/22/19, which manifested as crescent area of bright light in lower visual field, followed by migraine the next day which resolved but the visual disturbance persisted. Does not have hx of chronic migraines. Saw optho and GP on 9/23/19 and had b/w, f/u with optho again on 10/3/19 for angiogram which was unrevealing, not given any eye drops, next f/u on 11/7. Over same time course has had nonproductive cough, fullness in ears, sinus congestion, sore throat but no F/C or purulent discharge. Initially taking claritin, given medrol dose pack on 10/5 without improvement, followed by cedinir x 7days without improvement. \par \par No chronic sinus issues, no epistaxis, no ocular pressure, no scalp tenderness, no rashes, no dysuria, hematuria, decreased urine, unintentional weight loss approx 8lbs over ?9 months, no change in appetite, No paresthesias, no muscle weakness, F/C, no night sweats. No CP/SOB since cough improved, green sputum at the time. No sick contacts. \par \par SLE ROS negative for alopecia, sicca, salivary gland swelling, oral ulcers, malar rash, photosensitivity, SOB, chest pain, serositis, abd pain, dysuria, hematuria, rash, joint AM stiffness/synovitis, hematologic abnormalities, Raynauds. No pregnancies, no blood clots. \par \par Chronic easy bruising, no blood clots, breakthru bleeding in sept, no change to OCPs, LMP otherwise regular. 2014 -- PPD +, CXR normal, INH x 9 months completed for LTBI -- [place] the [place], no awareness of exposure. Reports most recent tattoo shortly before onset of symptoms, not infected, healed well. \par \par Labs: LINDA/SLE w/u negative \par --------\par 11/22/2019 - Since last visit, f/u with optho and noted that cotton wool is shrinking, vision has returned to normal, f/u again in Jan 2020. Recent dx of Strep s/p 10 days of Amoxicillin with improvement but with some sore throat/ hoarse voice, but no F/C. No other complaints. \par \par 9/28/22 -- Since last visit started on Synthroid 2/2 new dx of Hashimotos in April 2022, following with endo, sx of fatigue, hair loss, intermenstrual spotting have improved. PCOS well controlled with OCP, HTN well controlled with meds. Overall was feeling well until new cotton wool spot developed in July, has now resolved without treatment, optho exam benign other than that, tho rising CRP with normal ESR, saw another rheum in April where labs again neg - LINDA, TARAH, RF/CCP, HLA B27, ESR. Isolated R sided hip and knee arthralgias, improved with massage, no inflammatory arthritis. Lupus, inflammatory arthritis, myositis, vasculitis, Behcets ROS negative, no cartilage inflammation, never dx with inflammatory ocular disease at same time as either cotton wool.

## 2022-09-28 NOTE — REVIEW OF SYSTEMS
[Loss Of Hearing] : no hearing loss [Nosebleeds] : no nosebleeds [Constipation] : constipation [As Noted in HPI] : as noted in HPI [Arthralgias] : arthralgias [Joint Swelling] : no joint swelling [Joint Stiffness] : no joint stiffness [Negative] : ENT [FreeTextEntry7] : IBS-C

## 2022-10-11 ENCOUNTER — TRANSCRIPTION ENCOUNTER (OUTPATIENT)
Age: 32
End: 2022-10-11

## 2022-11-01 ENCOUNTER — TRANSCRIPTION ENCOUNTER (OUTPATIENT)
Age: 32
End: 2022-11-01

## 2022-11-01 RX ORDER — HYDROCHLOROTHIAZIDE 25 MG/1
25 TABLET ORAL
Qty: 90 | Refills: 1 | Status: DISCONTINUED | COMMUNITY
Start: 2021-05-19 | End: 2022-11-01

## 2022-11-02 ENCOUNTER — TRANSCRIPTION ENCOUNTER (OUTPATIENT)
Age: 32
End: 2022-11-02

## 2022-11-03 LAB
ANION GAP SERPL CALC-SCNC: 13 MMOL/L
BUN SERPL-MCNC: 9 MG/DL
CALCIUM SERPL-MCNC: 9.8 MG/DL
CHLORIDE SERPL-SCNC: 99 MMOL/L
CO2 SERPL-SCNC: 28 MMOL/L
CREAT SERPL-MCNC: 0.81 MG/DL
EGFR: 100 ML/MIN/1.73M2
GLUCOSE SERPL-MCNC: 93 MG/DL
POTASSIUM SERPL-SCNC: 3.2 MMOL/L
SODIUM SERPL-SCNC: 140 MMOL/L

## 2022-11-14 LAB
ANION GAP SERPL CALC-SCNC: 11 MMOL/L
BUN SERPL-MCNC: 7 MG/DL
CALCIUM SERPL-MCNC: 9.2 MG/DL
CHLORIDE SERPL-SCNC: 105 MMOL/L
CO2 SERPL-SCNC: 23 MMOL/L
CREAT SERPL-MCNC: 0.77 MG/DL
EGFR: 106 ML/MIN/1.73M2
GLUCOSE SERPL-MCNC: 87 MG/DL
POTASSIUM SERPL-SCNC: 4.4 MMOL/L
SODIUM SERPL-SCNC: 139 MMOL/L

## 2022-12-05 ENCOUNTER — APPOINTMENT (OUTPATIENT)
Dept: ORTHOPEDIC SURGERY | Facility: CLINIC | Age: 32
End: 2022-12-05

## 2022-12-05 VITALS
HEART RATE: 80 BPM | DIASTOLIC BLOOD PRESSURE: 86 MMHG | SYSTOLIC BLOOD PRESSURE: 124 MMHG | WEIGHT: 149 LBS | BODY MASS INDEX: 27.42 KG/M2 | HEIGHT: 62 IN

## 2022-12-05 PROCEDURE — 73502 X-RAY EXAM HIP UNI 2-3 VIEWS: CPT | Mod: RT

## 2022-12-05 PROCEDURE — 99203 OFFICE O/P NEW LOW 30 MIN: CPT

## 2022-12-07 NOTE — ADDENDUM
[FreeTextEntry1] : This note was written by Grant Cotter on 12/07/2022, acting as a scribe for Luke German III, MD

## 2022-12-07 NOTE — HISTORY OF PRESENT ILLNESS
[de-identified] : The patient comes in today with complaints referable to her right hip.  She states it has been going on for greater part of the year.  She has been in massage therapy and she is having persistent complaints of pain.  The patient states the onset/injury occurred in 10/2021.  This injury is not work related or due to an automobile accident.  The patient states the pain is intermittent and localized.  The patient describes the pain as achy.\par  [2] : a current pain level of 2/10 [de-identified] : Use [8] : the relief from treatment is 8/10 [1] : the ailment interference is 1/10 [(Does not interfere) 0] : the ailment interference is 0/10 (does not interfere) [3] : the ailment interference is 3/10 [] : No [de-identified] : Massage therapy

## 2022-12-07 NOTE — DISCUSSION/SUMMARY
[de-identified] : At this time, due to labral tear of the right hip, I recommended an MRI.  She will be reassessed after the MRI.\par

## 2022-12-07 NOTE — CONSULT LETTER
[Dear  ___] : Dear  [unfilled], [Consult Letter:] : I had the pleasure of evaluating your patient, [unfilled]. [Please see my note below.] : Please see my note below. [Consult Closing:] : Thank you very much for allowing me to participate in the care of this patient.  If you have any questions, please do not hesitate to contact me. [Sincerely,] : Sincerely, [FreeTextEntry3] : Luke German III, MD \par MANOLO/cori\par

## 2022-12-28 ENCOUNTER — NON-APPOINTMENT (OUTPATIENT)
Age: 32
End: 2022-12-28

## 2022-12-28 ENCOUNTER — APPOINTMENT (OUTPATIENT)
Dept: CARDIOLOGY | Facility: CLINIC | Age: 32
End: 2022-12-28

## 2022-12-28 VITALS
BODY MASS INDEX: 27.97 KG/M2 | WEIGHT: 152 LBS | DIASTOLIC BLOOD PRESSURE: 82 MMHG | HEIGHT: 62 IN | SYSTOLIC BLOOD PRESSURE: 123 MMHG | HEART RATE: 78 BPM

## 2022-12-28 DIAGNOSIS — I10 ESSENTIAL (PRIMARY) HYPERTENSION: ICD-10-CM

## 2022-12-28 PROCEDURE — 93000 ELECTROCARDIOGRAM COMPLETE: CPT

## 2022-12-28 PROCEDURE — 99214 OFFICE O/P EST MOD 30 MIN: CPT | Mod: 25

## 2022-12-28 RX ORDER — CHLORTHALIDONE 25 MG/1
25 TABLET ORAL
Qty: 90 | Refills: 3 | Status: DISCONTINUED | COMMUNITY
Start: 2022-08-04 | End: 2022-12-28

## 2022-12-28 RX ORDER — POTASSIUM CHLORIDE 750 MG/1
10 CAPSULE, EXTENDED RELEASE ORAL
Qty: 90 | Refills: 0 | Status: DISCONTINUED | COMMUNITY
Start: 2022-10-11 | End: 2022-12-28

## 2022-12-28 NOTE — HISTORY OF PRESENT ILLNESS
[FreeTextEntry1] : Pt is a 31 y/o F who presents today for f/u.  She has PMH HTN, Hashimoto now on levothyroxine.  She has been on OCP since age 18 - she tried to come off OCPs but her PCOS symptoms were bad and so she has restarted OCP.  \par Chlorthalidone caused low K - changed to losartan\par She states that she is feeling well and has no cardiac complaints - denies CP, SOB, diaphoresis, palpitations, dizziness, syncope, LE edema, PND, orthopnea, flushing. \par Home 's/80-90's\par \par TTE 10/2019 EF 75%, mild TR\par TTE 04/2021 EF 67%, mild TR\par VINNY 04/2021 no stenosis\par \par amlodipine caused constipation\par \par PMH: PCOS, latent TB, cotton wool spot, Hashimoto\par PCP PA Nya Verduzco\par gyn Dr Chi\par Family hx: mother HLD\par Smoking status: never\par social ETOH\par no drug use\par Current exercise: 4-5x/week cardio, resistance training\par Daily water intake: 60 oz\par Daily caffeine intake: 1 cup black tea\par OTC medications: probiotic, melatonin\par NKDA\par Previous hospitalizations: none\par 0 children\par

## 2022-12-28 NOTE — DISCUSSION/SUMMARY
[FreeTextEntry1] : Pt is a 31 y/o F with HTN\par \par HTN: \par diastolic BP not well controlled\par increase losartan\par TTE 10/2019 EF 75%, mild TR\par TTE 04/2021 EF 67%, mild TR\par VINNY 04/2021 no stenosis\par Advised low salt diet, regular exercise\par recent labs reviewed\par \par Pt will return in 6 mnths or sooner as needed but I encouraged communication via phone or portal if necessary. \par The described plan was discussed with the pt.  All questions and concerns were addressed to the best of my knowledge.

## 2023-01-05 ENCOUNTER — TRANSCRIPTION ENCOUNTER (OUTPATIENT)
Age: 33
End: 2023-01-05

## 2023-01-05 DIAGNOSIS — S73.191A OTHER SPRAIN OF RIGHT HIP, INITIAL ENCOUNTER: ICD-10-CM

## 2023-01-09 ENCOUNTER — TRANSCRIPTION ENCOUNTER (OUTPATIENT)
Age: 33
End: 2023-01-09

## 2023-03-21 ENCOUNTER — APPOINTMENT (OUTPATIENT)
Dept: FAMILY MEDICINE | Facility: CLINIC | Age: 33
End: 2023-03-21
Payer: COMMERCIAL

## 2023-03-21 VITALS
HEIGHT: 62 IN | BODY MASS INDEX: 28.16 KG/M2 | SYSTOLIC BLOOD PRESSURE: 120 MMHG | WEIGHT: 153 LBS | OXYGEN SATURATION: 99 % | DIASTOLIC BLOOD PRESSURE: 78 MMHG | TEMPERATURE: 98.6 F | HEART RATE: 81 BPM

## 2023-03-21 DIAGNOSIS — E03.8 OTHER SPECIFIED HYPOTHYROIDISM: ICD-10-CM

## 2023-03-21 PROCEDURE — 99395 PREV VISIT EST AGE 18-39: CPT | Mod: 25

## 2023-03-21 PROCEDURE — 36415 COLL VENOUS BLD VENIPUNCTURE: CPT

## 2023-03-21 RX ORDER — PANTOPRAZOLE 40 MG/1
40 TABLET, DELAYED RELEASE ORAL
Refills: 0 | Status: DISCONTINUED | COMMUNITY
End: 2023-03-21

## 2023-03-21 NOTE — HISTORY OF PRESENT ILLNESS
[de-identified] : Her last physical exam was last year\par \par Vaccines:\par Tetanus is up to date; last 3/15/2016\par COVID vaccine is up to date\par \par Her last dentist visit was less than one year ago\par Her last eye doctor appointment was less than one year ago, had cotton wool spot again in 7/2022 (L eye again in same spot), Dr. Fulton (retinologist)\par Her last dermatologist visit was less than one year ago\par \par GYN visit is up to date; last 2/2023 (Dr. Delgado)\par Mammogram is not yet medically indicated; she had breast US in 1/2019 benign/stable findings\par Her last colonoscopy was 4/19/2022 (due to change in bowel habits and elevated CRP) Dr. Peter; also had EGD and showed non-erosive gastritis but biopsies were benign (nonspecific changes and GERD changes, has Pantoprazole that she keeps on hand and only rarely/occas needs. Also had capsule endoscopy (2022) and was wnl. All was done to eval bloating sxs (these are still present but stable and manageable at this time) \par \par Her diet is healthy overall - eats cleanly and is mainly plant based. Takes MVI with B12 since is plant based. Tried low FODMAPs diet for a bit but did not help with her abd bloating sxs. \par Exercise: walking, weight training, stays physically active \par \par Sahara has a h/o elevated CRP, IBS, PCOS, HTN, and hypothyroidism. \par \par Sahara had card eval with Dr. Betty Camacho ([place]) in 12/2022. Losartan dose was increased from 25 mg to 50 at that time as DBP was elevated (86). BPs are improved since. Home BPs 110s-120s/low 80s or lower \par \par ALso saw Dr. Marie in 2022 for rheum eval as had a repeat cotton wool spot and tests were wnl; was advised to f/u again ASAP if has another cotton wool spot to do labs at time of acute sxs to see if can  anything\par \par She saw Dr. German in 12/2022 re R hip pain and is doing PT for this. \par \par Saw Endo Dr. Wei ([facility]) re felt she had sluggish thyroid. He did labs incl thyroid antibodies and she was dx with Hashimoto's. Titrated her thyroid dose and is now on 88 mcg. Has f/u with endo next month. Had some breakthrough bleeding most recent cycle and that was an issue in past when thyroid dose was low \par \par Engaged to be  in July 2024.

## 2023-03-21 NOTE — HEALTH RISK ASSESSMENT
[0] : 2) Feeling down, depressed, or hopeless: Not at all (0) [PHQ-2 Negative - No further assessment needed] : PHQ-2 Negative - No further assessment needed [Never] : Never [[id number]] : 0

## 2023-03-21 NOTE — REVIEW OF SYSTEMS
[Abdominal Pain] : abdominal pain [Constipation] : constipation [Joint Pain] : joint pain [Muscle Pain] : muscle pain [Lower Ext Edema] : no lower extremity edema [Diarrhea] : diarrhea [Vomiting] : no vomiting [Melena] : no melena [Negative] : Integumentary [FreeTextEntry7] : IBS-C with bloating, seeing Dr. Peter; had colonoscopy/EGD and small bowel eval in 2022 and all benign [FreeTextEntry9] : see HPI, medical massage helps

## 2023-03-21 NOTE — PLAN
[FreeTextEntry1] : Continue all medications as prescribed. Check labs as above incl will check TSH and CRP as she would like to trend these levels. Will adjust any medications based upon lab results.\par \par Reviewed age-appropriate preventive screening tests with patient. She is UTD on gyn exam and colonoscopy/EGD. \par \par BP goal is <=135/85 on average on home checks. Advised to let us know if BPs are above goal on home checks. \par \par Lifestyle measures to optimize BP reviewed, including regular exercise, adequate sleep, Mediterranean or DASH style clean eating, mindfulness/meditation, magnesium 100-400 mg supplementation, avoid NSAIDS, stress management techniques etc. \par \par Discussed clean eating (eg Mediterranean style eating plan) and regular exercise/staying as physically active as possible.  Include balance exercises and strength training and core strengthening exercises for bone health and to decrease risk for falls. \par \par IBS sxs, etiology, triggers, treatments reviewed. Try to id and avoid any food triggers. Eat cleanly (Mediterranean style eating plan, probiotic and prebiotic rich foods), stress reduction/mindfulness techniques, can try fiber supplement to see if helps. Can consider peppermint extract, papaya, fennel etc to see if helps sxs. Can consider adding low dose SSRI med if needed to help with symptom management. F/u with GI for further eval if incr/new symptoms or if symptoms are not improved with these measures or if ever any alarm symptoms (eg blood in stools, severe abdominal pain, unexplained fevers or weight loss etc).\par \par Reviewed importance of good self care (e.g. meditation, yoga, adequate rest, regular exercise, magnesium, clean eating, etc.).\par \par f/u with specialists as recommended by specialists\par \par Follow up for next physical in 1-3 years. RPA (HTN) visit in 6 months although if seeing card at that time she can defer on 6 mo RPA visit here if she prefers.\par

## 2023-03-21 NOTE — PHYSICAL EXAM
[No Acute Distress] : no acute distress [Well Developed] : well developed [Well-Appearing] : well-appearing [Normal Sclera/Conjunctiva] : normal sclera/conjunctiva [EOMI] : extraocular movements intact [No Lymphadenopathy] : no lymphadenopathy [No JVD] : no jugular venous distention [Supple] : supple [Thyroid Normal, No Nodules] : the thyroid was normal and there were no nodules present [No Respiratory Distress] : no respiratory distress  [No Accessory Muscle Use] : no accessory muscle use [Clear to Auscultation] : lungs were clear to auscultation bilaterally [Normal Rate] : normal rate  [Regular Rhythm] : with a regular rhythm [Normal S1, S2] : normal S1 and S2 [No Murmur] : no murmur heard [No Varicosities] : no varicosities [Pedal Pulses Present] : the pedal pulses are present [No Edema] : there was no peripheral edema [No Extremity Clubbing/Cyanosis] : no extremity clubbing/cyanosis [Soft] : abdomen soft [Non Tender] : non-tender [Non-distended] : non-distended [No Masses] : no abdominal mass palpated [No HSM] : no HSM [Normal Bowel Sounds] : normal bowel sounds [No Joint Swelling] : no joint swelling [Grossly Normal Strength/Tone] : grossly normal strength/tone [No Rash] : no rash [Coordination Grossly Intact] : coordination grossly intact [No Focal Deficits] : no focal deficits [Normal Gait] : normal gait [Normal Affect] : the affect was normal [Normal Insight/Judgement] : insight and judgment were intact [de-identified] :  mildly overweight

## 2023-03-21 NOTE — ASSESSMENT
[FreeTextEntry1] : SAHARA PATTEN is a 32 year old female here for a physical exam.  She is also here to follow up on medical issues as noted above.\par \par aShara has a h/o elevated CRP, IBS, PCOS, HTN, and hypothyroidism.

## 2023-03-22 ENCOUNTER — TRANSCRIPTION ENCOUNTER (OUTPATIENT)
Age: 33
End: 2023-03-22

## 2023-03-22 DIAGNOSIS — R79.89 OTHER SPECIFIED ABNORMAL FINDINGS OF BLOOD CHEMISTRY: ICD-10-CM

## 2023-03-22 LAB
ALBUMIN SERPL ELPH-MCNC: 4.2 G/DL
ALP BLD-CCNC: 40 U/L
ALT SERPL-CCNC: 49 U/L
ANION GAP SERPL CALC-SCNC: 12 MMOL/L
AST SERPL-CCNC: 32 U/L
BASOPHILS # BLD AUTO: 0.06 K/UL
BASOPHILS NFR BLD AUTO: 0.8 %
BILIRUB SERPL-MCNC: 0.4 MG/DL
BUN SERPL-MCNC: 8 MG/DL
CALCIUM SERPL-MCNC: 9.4 MG/DL
CHLORIDE SERPL-SCNC: 105 MMOL/L
CHOLEST SERPL-MCNC: 212 MG/DL
CO2 SERPL-SCNC: 23 MMOL/L
CREAT SERPL-MCNC: 0.77 MG/DL
CRP SERPL-MCNC: 6 MG/L
EGFR: 106 ML/MIN/1.73M2
EOSINOPHIL # BLD AUTO: 0.05 K/UL
EOSINOPHIL NFR BLD AUTO: 0.7 %
GLUCOSE SERPL-MCNC: 91 MG/DL
HCT VFR BLD CALC: 42 %
HDLC SERPL-MCNC: 59 MG/DL
HGB BLD-MCNC: 13.4 G/DL
IMM GRANULOCYTES NFR BLD AUTO: 0.3 %
LDLC SERPL CALC-MCNC: 130 MG/DL
LYMPHOCYTES # BLD AUTO: 2.86 K/UL
LYMPHOCYTES NFR BLD AUTO: 37.3 %
MAN DIFF?: NORMAL
MCHC RBC-ENTMCNC: 29.5 PG
MCHC RBC-ENTMCNC: 31.9 GM/DL
MCV RBC AUTO: 92.5 FL
MONOCYTES # BLD AUTO: 0.65 K/UL
MONOCYTES NFR BLD AUTO: 8.5 %
NEUTROPHILS # BLD AUTO: 4.03 K/UL
NEUTROPHILS NFR BLD AUTO: 52.4 %
NONHDLC SERPL-MCNC: 152 MG/DL
PLATELET # BLD AUTO: 324 K/UL
POTASSIUM SERPL-SCNC: 4.2 MMOL/L
PROT SERPL-MCNC: 6.9 G/DL
RBC # BLD: 4.54 M/UL
RBC # FLD: 12.6 %
SODIUM SERPL-SCNC: 140 MMOL/L
TRIGL SERPL-MCNC: 110 MG/DL
TSH SERPL-ACNC: 1.53 UIU/ML
WBC # FLD AUTO: 7.67 K/UL

## 2023-03-25 ENCOUNTER — TRANSCRIPTION ENCOUNTER (OUTPATIENT)
Age: 33
End: 2023-03-25

## 2023-08-18 ENCOUNTER — NON-APPOINTMENT (OUTPATIENT)
Age: 33
End: 2023-08-18

## 2023-08-23 ENCOUNTER — APPOINTMENT (OUTPATIENT)
Dept: CARDIOLOGY | Facility: CLINIC | Age: 33
End: 2023-08-23
Payer: COMMERCIAL

## 2023-08-23 VITALS
WEIGHT: 160 LBS | BODY MASS INDEX: 29.44 KG/M2 | HEIGHT: 62 IN | DIASTOLIC BLOOD PRESSURE: 72 MMHG | OXYGEN SATURATION: 100 % | HEART RATE: 82 BPM | SYSTOLIC BLOOD PRESSURE: 110 MMHG

## 2023-08-23 PROCEDURE — 99213 OFFICE O/P EST LOW 20 MIN: CPT | Mod: 25

## 2023-08-23 PROCEDURE — 93000 ELECTROCARDIOGRAM COMPLETE: CPT

## 2023-08-23 RX ORDER — LOSARTAN POTASSIUM 50 MG/1
50 TABLET, FILM COATED ORAL DAILY
Qty: 90 | Refills: 3 | Status: ACTIVE | COMMUNITY
Start: 2022-11-02 | End: 1900-01-01

## 2023-08-23 NOTE — DISCUSSION/SUMMARY
[FreeTextEntry1] : Pt is a 32 y/o F with HTN  HTN:  well controlled c/w losartan 50mg qd TTE 10/2019 EF 75%, mild TR TTE 04/2021 EF 67%, mild TR VINNY 04/2021 no stenosis Advised low salt diet, regular exercise recent labs reviewed  Pt will return in 12 mnths or sooner as needed but I encouraged communication via phone or portal if necessary.  The described plan was discussed with the pt.  All questions and concerns were addressed to the best of my knowledge.  [EKG obtained to assist in diagnosis and management of assessed problem(s)] : EKG obtained to assist in diagnosis and management of assessed problem(s)

## 2023-08-23 NOTE — HISTORY OF PRESENT ILLNESS
[FreeTextEntry1] : Pt is a 34 y/o F who presents today for f/u.  She has PMH HTN, Hashimoto now on levothyroxine.  She has been on OCP since age 18 - she tried to come off OCPs but her PCOS symptoms were bad and so she has restarted OCP.   Chlorthalidone caused hypokalemia - changed to losartan She states that she is feeling well and has no cardiac complaints - denies CP, SOB, diaphoresis, palpitations, dizziness, syncope, LE edema, PND, orthopnea, flushing.  Home 's/70's  TTE 10/2019 EF 75%, mild TR TTE 04/2021 EF 67%, mild TR VINNY 04/2021 no stenosis  amlodipine caused constipation  PMH: PCOS, latent TB, cotton wool spot, Hashimoto PCP [facility] g[facility] Dr Chi Family hx: mother HLD Smoking status: never social ETOH no drug use Current exercise: 4-5x/week cardio, resistance training Daily water intake: 60 oz Daily caffeine intake: 1 cup black tea OTC medications: probiotic, melatonin NKDA Previous hospitalizations: none 0 children

## 2023-08-25 ENCOUNTER — APPOINTMENT (OUTPATIENT)
Dept: CT IMAGING | Facility: HOSPITAL | Age: 33
End: 2023-08-25
Payer: COMMERCIAL

## 2023-08-25 ENCOUNTER — OUTPATIENT (OUTPATIENT)
Dept: OUTPATIENT SERVICES | Facility: HOSPITAL | Age: 33
LOS: 1 days | End: 2023-08-25
Payer: COMMERCIAL

## 2023-08-25 DIAGNOSIS — R14.0 ABDOMINAL DISTENSION (GASEOUS): ICD-10-CM

## 2023-08-25 PROCEDURE — 74177 CT ABD & PELVIS W/CONTRAST: CPT

## 2023-08-25 PROCEDURE — 74177 CT ABD & PELVIS W/CONTRAST: CPT | Mod: 26

## 2023-09-20 ENCOUNTER — TRANSCRIPTION ENCOUNTER (OUTPATIENT)
Age: 33
End: 2023-09-20

## 2024-01-02 ENCOUNTER — TRANSCRIPTION ENCOUNTER (OUTPATIENT)
Age: 34
End: 2024-01-02

## 2024-02-05 ENCOUNTER — NON-APPOINTMENT (OUTPATIENT)
Age: 34
End: 2024-02-05

## 2024-03-21 DIAGNOSIS — R79.0 ABNORMAL LVL OF BLOOD MINERAL: ICD-10-CM

## 2024-03-25 ENCOUNTER — APPOINTMENT (OUTPATIENT)
Dept: FAMILY MEDICINE | Facility: CLINIC | Age: 34
End: 2024-03-25
Payer: COMMERCIAL

## 2024-03-25 VITALS
HEIGHT: 62 IN | TEMPERATURE: 97.8 F | OXYGEN SATURATION: 100 % | HEART RATE: 77 BPM | WEIGHT: 158 LBS | DIASTOLIC BLOOD PRESSURE: 84 MMHG | SYSTOLIC BLOOD PRESSURE: 126 MMHG | BODY MASS INDEX: 29.08 KG/M2

## 2024-03-25 DIAGNOSIS — I10 ESSENTIAL (PRIMARY) HYPERTENSION: ICD-10-CM

## 2024-03-25 DIAGNOSIS — R10.13 EPIGASTRIC PAIN: ICD-10-CM

## 2024-03-25 DIAGNOSIS — K58.9 IRRITABLE BOWEL SYNDROME W/OUT DIARRHEA: ICD-10-CM

## 2024-03-25 DIAGNOSIS — Z00.00 ENCOUNTER FOR GENERAL ADULT MEDICAL EXAMINATION W/OUT ABNORMAL FINDINGS: ICD-10-CM

## 2024-03-25 DIAGNOSIS — E03.8 OTHER SPECIFIED HYPOTHYROIDISM: ICD-10-CM

## 2024-03-25 DIAGNOSIS — K76.0 FATTY (CHANGE OF) LIVER, NOT ELSEWHERE CLASSIFIED: ICD-10-CM

## 2024-03-25 DIAGNOSIS — E28.2 POLYCYSTIC OVARIAN SYNDROME: ICD-10-CM

## 2024-03-25 DIAGNOSIS — K64.9 UNSPECIFIED HEMORRHOIDS: ICD-10-CM

## 2024-03-25 DIAGNOSIS — E06.3 OTHER SPECIFIED HYPOTHYROIDISM: ICD-10-CM

## 2024-03-25 PROCEDURE — 99395 PREV VISIT EST AGE 18-39: CPT

## 2024-03-25 PROCEDURE — 36415 COLL VENOUS BLD VENIPUNCTURE: CPT

## 2024-03-25 RX ORDER — LEVOTHYROXINE SODIUM 0.1 MG/1
100 TABLET ORAL
Qty: 90 | Refills: 3 | Status: ACTIVE | COMMUNITY

## 2024-03-25 RX ORDER — LEVOTHYROXINE SODIUM 88 UG/1
88 CAPSULE ORAL DAILY
Refills: 0 | Status: DISCONTINUED | COMMUNITY
End: 2024-03-25

## 2024-03-25 NOTE — REVIEW OF SYSTEMS
[Abdominal Pain] : abdominal pain [Constipation] : constipation [Joint Pain] : joint pain [Muscle Pain] : muscle pain [Negative] : Heme/Lymph [Night Sweats] : night sweats [Heartburn] : heartburn [Fever] : no fever [Chills] : no chills [Fatigue] : no fatigue [Lower Ext Edema] : no lower extremity edema [Diarrhea] : diarrhea [Vomiting] : no vomiting [Melena] : no melena [FreeTextEntry2] : occas night sweats (around 3x/wk), had CBC with endo 1/024 and was wnl, no other sxs, she is not concerned about these at this time, Will let me know if signif change in freq o fsxs or any addtl sxs [FreeTextEntry9] : see HPI, medical massage helps [FreeTextEntry7] : IBS-C with bloating, seeing Dr. Peter; had colonoscopy/EGD and small bowel eval in 2022 and all benign, Has hemorrhoids (int and ext), occas bleeding, occas discomfort, uses OTC meds ; getting heartburn a couple times a week, tried Pantoprazole in the past though did not love it, takes Tums occas and we disc can try H2 blocker prn, also hemorrhoids, has carlos CR surg in past and no intervention was needed, sxs not too bothersome so does not feel need to go back to CR at this time

## 2024-03-25 NOTE — HEALTH RISK ASSESSMENT
[0] : 2) Feeling down, depressed, or hopeless: Not at all (0) [PHQ-2 Negative - No further assessment needed] : PHQ-2 Negative - No further assessment needed [Never] : Never [[id number]] : 0

## 2024-03-25 NOTE — PLAN
[FreeTextEntry1] : Continue all medications as prescribed. Check labs as above. Will adjust any medications based upon lab results.  Reviewed age-appropriate preventive screening tests with patient. UTD on gyn exam and CRC screening.   She  sees a cardiologist regularly and does not need an EKG today.  BP goal is <=135/85 on average on home checks. Advised to let us know if BPs are above goal on home checks.   Lifestyle measures to optimize BP reviewed, including regular exercise, adequate sleep, Mediterranean or DASH style clean eating, mindfulness/meditation, magnesium 100-400 mg supplementation, avoid NSAIDS, stress management techniques etc.   Reviewed fatty liver causes and potential complications. Reviewed that eating cleanly (e.g. Mediterranean plant based eating), weight loss, regular exercise, avoidance of processed foods and sweetened beverages, avoidance of alcohol, keeping cholesterol, glucose and A1C levels optimized will all help to improve fatty liver and reduce risk for complications of fatty liver including fibrosis and cirrhosis. Follow up with GI as recommended by them.  Discussed clean eating (eg Mediterranean style plant based eating plan recommended) and regular exercise/staying as physically active as possible.  Include balance exercises and strength training and core strengthening exercises for bone health and to decrease risk for falls.  Revd/recommended consider psyllium husk fiber supplement in addtn to clean eating/exercise as noted above. If unable to lose wgt with these measures might need to consider trial of Metformin vs GLP 1 agonist med. Revd r/b/se of these types of meds incl that we do not yet fully known medium to longer term poss risks of the GLP 1 agonist meds. Recommend she d/w card and GI team to get their input on whether she should consider trial of adding Rx med to help with wgt loss.   IBS sxs, etiology, triggers, treatments reviewed. Try to id and avoid any food triggers. Eat cleanly (Mediterranean style eating plan, probiotic and prebiotic rich foods), stress reduction/mindfulness techniques, can try fiber supplement to see if helps. Can consider adding low dose SSRI med if needed to help with symptom management. See GI for further eval if incr/new symptoms or if symptoms are not improved with these measures or if ever any alarm symptoms (eg blood in stools, severe abdominal pain, unexplained fevers or weight loss etc).  Hemorrhoid treatment and prevention strategies reviewed, including avoiding prolonged sitting on toilet and straining with BMs, avoid excessive wiping of perirectal region, hydrate very well, eat plenty of fiber rich foods daily +/- use psyllium husk based fiber supplement, use of barrier cream (eg A&D) as needed, OTC Prep H/witch hazel preparations as needed etc. See colorectal surgeon for eval and treatment if needed for increased/new or life interfering symptoms.   General treatment recommendations for GERD include the following: -  Maintain a healthy weight. Excess pounds put pressure on your abdomen, pushing up your stomach and causing acid to reflux into your esophagus. -  Stop smoking. Smoking decreases the lower esophageal sphincter's ability to function properly. -  Elevate the head of your bed. If you regularly experience heartburn while trying to sleep, place wood or cement blocks under the feet at the head end of your bed. Raise the head end by 6 to 9 inches. If you can't elevate your bed, you can insert a wedge between your mattress and box spring to elevate your body from the waist up. Raising your head with additional pillows isn't effective. -  Start on your left side. When you go to bed, start by lying on your left side to help make it less likely that you will have reflux. -  Don't lie down after a meal. Wait at least three hours after eating before lying down or going to bed. -  Eat food slowly and chew thoroughly. Put down your fork after every bite and pick it up again once you have chewed and swallowed that bite. -  Avoid foods and drinks that trigger reflux. Common triggers include alcohol, chocolate, caffeine, fatty foods or peppermint. -  Avoid tight-fitting clothing. Clothes that fit tightly around your waist put pressure on your abdomen and the lower esophageal sphincter.  In addition to the recommendations above, over the counter medications such as H2 blockers (e.g. Famotidine/Pepcid) and PPIs (Prilosec, Prevacid, Nexium, et. al. and their generic equivalents) can be helpful. H2 blockers may be used PRN but PPIs are generally taken daily for several days or weeks. If a PPI is necessary to control GERD symptoms for longer than 4 weeks, follow up with a gastroenterologist is recommended to discuss having an upper endoscopy.  Reviewed that generally the goal is to try to get to the lowest level of med use that will keep symptoms well controlled to minimize SE (eg H2 blocker med daily is preferable to PPI med daily, H2 blocker med prn is preferable to H2 blocker med daily etc).  Recommend RTO or see a gastroenterologist if increasing/new symptoms, or if above measures are not helping, or if requiring more than occasional med use, or if any alarm symptoms are present (eg blood in stools or emesis, unexplained weight loss, dysphagia, odynophagia, severe abdominal pain etc.).   Recommended Tylenol XS or Arthritis 1-2 pills BID-TID if helpful, ok to use NSAIDs sparingly with food (but revd r/b/se of NSAIDs incl CV, renal and GI and she should limit use as much as possible), regular stretching, heat/ice prn, consider turmeric supplementation, consider CBD cream or oral options, gentle yoga/chair yoga, Pilates, strengthen core muscles, consider chiro and/or massage and/or acupuncture. If these measures are not helpful enough then consider consultation with ortho and/or pain  for further eval and treatment.   Reviewed importance of good self care (e.g. meditation, yoga, adequate rest, regular exercise, magnesium, clean eating, etc.).  Follow up with specialists as recommended by them. I asked Sahara to have her endo send info as nothing recd. Appreciate card and GI notes/test results  Follow up for next physical in one year. Schedule RPA (HTN) visit in about 6 months although if seeing card/Endo at that time she can defer on 6 mo RPA visit here if she prefers, though if starts Rx med for wgt loss must f/u by 6  mos.

## 2024-03-25 NOTE — ASSESSMENT
[FreeTextEntry1] : LIVIER PATTEN is a 33 year old female here for a physical exam.  She is also here to follow up on medical issues as noted above.

## 2024-03-25 NOTE — PHYSICAL EXAM
[No Acute Distress] : no acute distress [Well Developed] : well developed [Well-Appearing] : well-appearing [Normal Sclera/Conjunctiva] : normal sclera/conjunctiva [EOMI] : extraocular movements intact [Normal Outer Ear/Nose] : the outer ears and nose were normal in appearance [Normal Oropharynx] : the oropharynx was normal [No Lymphadenopathy] : no lymphadenopathy [No JVD] : no jugular venous distention [Thyroid Normal, No Nodules] : the thyroid was normal and there were no nodules present [Supple] : supple [No Accessory Muscle Use] : no accessory muscle use [No Respiratory Distress] : no respiratory distress  [Clear to Auscultation] : lungs were clear to auscultation bilaterally [Normal Rate] : normal rate  [Regular Rhythm] : with a regular rhythm [Normal S1, S2] : normal S1 and S2 [No Murmur] : no murmur heard [No Varicosities] : no varicosities [No Edema] : there was no peripheral edema [Pedal Pulses Present] : the pedal pulses are present [No Extremity Clubbing/Cyanosis] : no extremity clubbing/cyanosis [Soft] : abdomen soft [Non-distended] : non-distended [Non Tender] : non-tender [No Masses] : no abdominal mass palpated [Normal Bowel Sounds] : normal bowel sounds [No HSM] : no HSM [Normal Posterior Cervical Nodes] : no posterior cervical lymphadenopathy [Normal Anterior Cervical Nodes] : no anterior cervical lymphadenopathy [No Joint Swelling] : no joint swelling [Grossly Normal Strength/Tone] : grossly normal strength/tone [No Rash] : no rash [Coordination Grossly Intact] : coordination grossly intact [No Focal Deficits] : no focal deficits [Normal Gait] : normal gait [Normal Affect] : the affect was normal [Normal Insight/Judgement] : insight and judgment were intact [de-identified] :  mildly overweight

## 2024-03-25 NOTE — HISTORY OF PRESENT ILLNESS
[FreeTextEntry1] : LIVIER PATTEN is a 33 year old female here for a physical exam. [de-identified] : Her last physical exam was last year  Vaccines: Tetanus is up to date, Tdap 3/2016  Her last dentist visit was less than one year ago Her last eye doctor appointment was less than one year ago, has h/o cotton wool spots (L eye, stable over past several yrs), Dr. Fulton (retinologist) Her last dermatologist visit was less than one year ago  GYN visit is up to date, 2/2023 Dr. Delgado, has appt 3/2024 with gyn in [place] (whom she saw in 12/2023 for breakthrough bleeding, which was due to Metformin, and resolved when she stopped med  Mammogram is not yet medically indicated; she had breast US in 1/2019 benign/stable findings Colorectal cancer screening is up to date, colonoscopy 4/19/2022 (due to change in bowel habits and elevated CRP) Dr. Peter; also had EGD and showed non-erosive gastritis but biopsies were benign (nonspecific changes and GERD changes, has Pantoprazole that she keeps on hand and only rarely/occas needs. Also had capsule endoscopy (2022) and was wnl. All was done to eval bloating sxs (these are still present but stable and manageable at this time)  Her diet is healthy overall, vegan diet mostly, occas eggs/yogurt/fish Exercise: regular card/wgt training; also does massage therapy for chronic diffuse muscle/joint achiness/stiffness and finds this to be helpful  Sahara has Hashimoto's thyroiditis/hypothyroidism, elevated CRP, IBS, PCOS, HTN, and cotton wool spot L eye (prior rheum eval done at time of spot first being noted was benign/wnl), OA/chronic muscle pain.   Sahara is UTD on follow up and recommended testing with her specialists incl Dr. Betty Camacho (card), Dr. Wei/Yoandy (Endo, [facility]), Dr. Peter (GI). She also saw Dr. Marie in 2022 for rheum eval as had a repeat cotton wool spot and tests were wnl; was advised to f/u again ASAP if has another cotton wool spot to repeat labs.  At her CPE visit 3/2023 ALT 49, , CRP 6. She is working on eating as cleanly as possible and exercising regularly. Will recheck labs today. We are discussing consideration of nonurgent rheum eval in light of her other autoimmune issues (eg Hashimoto's) and elevated CRP level and chronic diffuse muscle/joint pains, to see if there is any other underlying autoimmune/rheum issue   She had eval with Dr. Brandonfo re the elevated LFTs. Labs for secondary causes of elevated LFTs were all wnl except for mildly low ferritin (13). Fibroscan imaging fortunately showed no evidence of fibrosis. Testing for SIBO was negative. Is exercising regularly. Eats vegan/pescatarian diet plan. Has lost a few pounds. ETOH is at most a few drinks a month   She is Engaged to be  in July 2024.  Saw endo for f/u re her NAFLD. Fasting glucose and A1C were wnl though fasting insulin was mildly elevated. Did trial of Metformin but was not well tolerated (caused acne and breakthrough bleeding). Had Endo f/u and stopped Metformin in 1/2024. They did not feel she needs to consider GLP 1 agonist at this time. Had repeat labs with Endo and Endo not overly concerned about her LFTs at this time александр as repeat LFTs were wnl in ealry 2024 (she brought labs in for review and we revd/scanned into chart). She would like LFts repeating today   Does o levothyroxine was changed to 100 mcg daily in later 2023. Rpt TFts 1/2024 wnl with endo

## 2024-04-03 LAB
ALBUMIN SERPL ELPH-MCNC: 4.2 G/DL
ALP BLD-CCNC: 42 U/L
ALT SERPL-CCNC: 26 U/L
ANION GAP SERPL CALC-SCNC: 11 MMOL/L
AST SERPL-CCNC: 20 U/L
BASOPHILS # BLD AUTO: 0.04 K/UL
BASOPHILS NFR BLD AUTO: 0.4 %
BILIRUB SERPL-MCNC: 0.2 MG/DL
BUN SERPL-MCNC: 8 MG/DL
CALCIUM SERPL-MCNC: 9.3 MG/DL
CHLORIDE SERPL-SCNC: 103 MMOL/L
CHOLEST SERPL-MCNC: 201 MG/DL
CO2 SERPL-SCNC: 23 MMOL/L
CREAT SERPL-MCNC: 0.81 MG/DL
CRP SERPL-MCNC: 12 MG/L
EGFR: 99 ML/MIN/1.73M2
EOSINOPHIL # BLD AUTO: 0.04 K/UL
EOSINOPHIL NFR BLD AUTO: 0.4 %
ERYTHROCYTE [SEDIMENTATION RATE] IN BLOOD BY WESTERGREN METHOD: 21 MM/HR
FERRITIN SERPL-MCNC: 21 NG/ML
GLUCOSE SERPL-MCNC: 87 MG/DL
HCT VFR BLD CALC: 40.4 %
HDLC SERPL-MCNC: 59 MG/DL
HGB BLD-MCNC: 13.4 G/DL
IMM GRANULOCYTES NFR BLD AUTO: 0.3 %
IRON SATN MFR SERPL: 23 %
IRON SERPL-MCNC: 86 UG/DL
LDLC SERPL CALC-MCNC: 114 MG/DL
LYMPHOCYTES # BLD AUTO: 2.45 K/UL
LYMPHOCYTES NFR BLD AUTO: 26.8 %
MAN DIFF?: NORMAL
MCHC RBC-ENTMCNC: 28.9 PG
MCHC RBC-ENTMCNC: 33.2 GM/DL
MCV RBC AUTO: 87.3 FL
MONOCYTES # BLD AUTO: 0.49 K/UL
MONOCYTES NFR BLD AUTO: 5.4 %
NEUTROPHILS # BLD AUTO: 6.08 K/UL
NEUTROPHILS NFR BLD AUTO: 66.7 %
NONHDLC SERPL-MCNC: 142 MG/DL
PLATELET # BLD AUTO: 326 K/UL
POTASSIUM SERPL-SCNC: 4.2 MMOL/L
PROT SERPL-MCNC: 7.3 G/DL
RBC # BLD: 4.63 M/UL
RBC # FLD: 12.3 %
SODIUM SERPL-SCNC: 137 MMOL/L
TIBC SERPL-MCNC: 376 UG/DL
TRIGL SERPL-MCNC: 159 MG/DL
UIBC SERPL-MCNC: 290 UG/DL
WBC # FLD AUTO: 9.13 K/UL

## 2024-04-04 ENCOUNTER — NON-APPOINTMENT (OUTPATIENT)
Age: 34
End: 2024-04-04

## 2024-05-07 ENCOUNTER — NON-APPOINTMENT (OUTPATIENT)
Age: 34
End: 2024-05-07

## 2024-05-08 ENCOUNTER — NON-APPOINTMENT (OUTPATIENT)
Age: 34
End: 2024-05-08

## 2024-06-14 ENCOUNTER — APPOINTMENT (OUTPATIENT)
Dept: RHEUMATOLOGY | Facility: CLINIC | Age: 34
End: 2024-06-14
Payer: COMMERCIAL

## 2024-06-14 VITALS
HEIGHT: 62 IN | RESPIRATION RATE: 16 BRPM | SYSTOLIC BLOOD PRESSURE: 120 MMHG | DIASTOLIC BLOOD PRESSURE: 82 MMHG | BODY MASS INDEX: 29.44 KG/M2 | OXYGEN SATURATION: 99 % | TEMPERATURE: 98 F | WEIGHT: 160 LBS | HEART RATE: 85 BPM

## 2024-06-14 DIAGNOSIS — M26.622 ARTHRALGIA OF LEFT TEMPOROMANDIBULAR JOINT: ICD-10-CM

## 2024-06-14 DIAGNOSIS — R79.82 ELEVATED C-REACTIVE PROTEIN (CRP): ICD-10-CM

## 2024-06-14 DIAGNOSIS — H35.81 RETINAL EDEMA: ICD-10-CM

## 2024-06-14 PROCEDURE — 99214 OFFICE O/P EST MOD 30 MIN: CPT

## 2024-06-14 NOTE — REASON FOR VISIT
[Follow-Up: _____] : a [unfilled] follow-up visit [FreeTextEntry1] : cotton wool spot on L retina x 2, chronic mild CRP +

## 2024-06-14 NOTE — REVIEW OF SYSTEMS
[Constipation] : constipation [Arthralgias] : arthralgias [As Noted in HPI] : as noted in HPI [Abn Vaginal Bleeding] : unexplained vaginal bleeding [Negative] : Eyes [Joint Swelling] : no joint swelling [Joint Stiffness] : no joint stiffness [FreeTextEntry4] : + L TMJ [FreeTextEntry7] : IBS-C

## 2024-06-14 NOTE — HISTORY OF PRESENT ILLNESS
[FreeTextEntry1] : LIVIER PATTEN is a 29 year old woman who presents with solitary cotton wool spot in L eye x 1 month. Spontaneous onset on 9/22/19, which manifested as crescent area of bright light in lower visual field, followed by migraine the next day which resolved but the visual disturbance persisted. Does not have hx of chronic migraines. Saw optho and GP on 9/23/19 and had b/w, f/u with optho again on 10/3/19 for angiogram which was unrevealing, not given any eye drops, next f/u on 11/7. Over same time course has had nonproductive cough, fullness in ears, sinus congestion, sore throat but no F/C or purulent discharge. Initially taking claritin, given medrol dose pack on 10/5 without improvement, followed by cedinir x 7days without improvement.   No chronic sinus issues, no epistaxis, no ocular pressure, no scalp tenderness, no rashes, no dysuria, hematuria, decreased urine, unintentional weight loss approx 8lbs over ?9 months, no change in appetite, No paresthesias, no muscle weakness, F/C, no night sweats. No CP/SOB since cough improved, green sputum at the time. No sick contacts.   SLE ROS negative for alopecia, sicca, salivary gland swelling, oral ulcers, malar rash, photosensitivity, SOB, chest pain, serositis, abd pain, dysuria, hematuria, rash, joint AM stiffness/synovitis, hematologic abnormalities, Raynauds. No pregnancies, no blood clots.   Chronic easy bruising, no blood clots, breakthru bleeding in sept, no change to OCPs, LMP otherwise regular. 2014 -- PPD +, CXR normal, INH x 9 months completed for LTBI -- [place] the [place], no awareness of exposure. Reports most recent tattoo shortly before onset of symptoms, not infected, healed well.   Labs: LINDA/SLE w/u negative  -------- 11/22/2019 - Since last visit, f/u with optho and noted that cotton wool is shrinking, vision has returned to normal, f/u again in Jan 2020. Recent dx of Strep s/p 10 days of Amoxicillin with improvement but with some sore throat/ hoarse voice, but no F/C. No other complaints.   9/28/22 -- Since last visit started on Synthroid 2/2 new dx of Hashimotos in April 2022, following with endo, sx of fatigue, hair loss, intermenstrual spotting have improved. PCOS well controlled with OCP, HTN well controlled with meds. Overall was feeling well until new cotton wool spot developed in July, has now resolved without treatment, optho exam benign other than that, tho rising CRP with normal ESR, saw another rheum in April where labs again neg - LINDA, TARAH, RF/CCP, HLA B27, ESR. Isolated R sided hip and knee arthralgias, improved with massage, no inflammatory arthritis. Lupus, inflammatory arthritis, myositis, vasculitis, Behcets ROS negative, no cartilage inflammation, never dx with inflammatory ocular disease at same time as either cotton wool.   6/14/24 -- Returns for CRP elevation on recent PMD labs. CTD, inflammatory arthritis, vasculitis, Behcets ROS negative. No vision issues, optho exam last year was stable. L sided TMJ, will be seeing specialist upcoming. Experiencing breakthru bleeding - no improvement with stopping metformin or adjusting Synthroid dose, GYN has sent her to a specialist.

## 2024-06-14 NOTE — PHYSICAL EXAM
[General Appearance - Alert] : alert [General Appearance - In No Acute Distress] : in no acute distress [General Appearance - Well Nourished] : well nourished [Sclera] : the sclera and conjunctiva were normal [PERRL With Normal Accommodation] : pupils were equal in size, round, and reactive to light [Extraocular Movements] : extraocular movements were intact [Outer Ear] : the ears and nose were normal in appearance [Oropharynx] : the oropharynx was normal [Nasal Cavity] : the nasal mucosa and septum were normal [Neck Appearance] : the appearance of the neck was normal [Neck Cervical Mass (___cm)] : no neck mass was observed [Auscultation Breath Sounds / Voice Sounds] : lungs were clear to auscultation bilaterally [Heart Rate And Rhythm] : heart rate was normal and rhythm regular [Heart Sounds] : normal S1 and S2 [Heart Sounds Gallop] : no gallops [Murmurs] : no murmurs [Heart Sounds Pericardial Friction Rub] : no pericardial rub [Edema] : there was no peripheral edema [No Spinal Tenderness] : no spinal tenderness [Abnormal Walk] : normal gait [Nail Clubbing] : no clubbing  or cyanosis of the fingernails [Musculoskeletal - Swelling] : no joint swelling seen [Motor Tone] : muscle strength and tone were normal [] : no rash [Motor Exam] : the motor exam was normal [No Focal Deficits] : no focal deficits [Oriented To Time, Place, And Person] : oriented to person, place, and time [Impaired Insight] : insight and judgment were intact [Affect] : the affect was normal [FreeTextEntry1] : No synovitis, ROM diffusely intact

## 2024-06-14 NOTE — ASSESSMENT
[FreeTextEntry1] : LIVIER PATTEN is a 34 year old woman with hx of solitary cotton wool spot in L eye x 2 now, both with spontaneous resolution and no further inflammatory ocular issues.   # persistent CRP elevation but remainder of extensive serological w/u in 2019 and partial repeat in 2022 negative for a systemic rheum etiology, aside from Hashimotos which she follows with Endo for. Remains with paucity of systemic inflammatory sx of lupus, inflammatory arthritis, vasculitis, RP, Behcets today. ESR age adjusted is normal.  - reviewed CRP trend - CRP is nonspecific and no role to empirically treat or trend unless new inflammatory sx - agree with GYN and TMJ specialist evals for her current complaints  - can try warm compresses and PRN NSAIDs with food for TMJ until can see specialist   RTC prn

## 2024-08-14 LAB
ALBUMIN SERPL ELPH-MCNC: 4.2 G/DL
ALP BLD-CCNC: 47 U/L
ALT SERPL-CCNC: 30 U/L
ANION GAP SERPL CALC-SCNC: 11 MMOL/L
AST SERPL-CCNC: 26 U/L
BILIRUB SERPL-MCNC: 0.2 MG/DL
BUN SERPL-MCNC: 8 MG/DL
CALCIUM SERPL-MCNC: 9 MG/DL
CHLORIDE SERPL-SCNC: 103 MMOL/L
CHOLEST SERPL-MCNC: 223 MG/DL
CO2 SERPL-SCNC: 23 MMOL/L
CREAT SERPL-MCNC: 0.8 MG/DL
EGFR: 100 ML/MIN/1.73M2
GLUCOSE SERPL-MCNC: 91 MG/DL
HDLC SERPL-MCNC: 63 MG/DL
LDLC SERPL CALC-MCNC: 138 MG/DL
NONHDLC SERPL-MCNC: 160 MG/DL
POTASSIUM SERPL-SCNC: 4.2 MMOL/L
PROT SERPL-MCNC: 6.8 G/DL
SODIUM SERPL-SCNC: 137 MMOL/L
TRIGL SERPL-MCNC: 126 MG/DL
TSH SERPL-ACNC: 4.08 UIU/ML

## 2024-08-15 PROBLEM — K22.10 EROSIVE ESOPHAGITIS: Status: RESOLVED | Noted: 2022-04-28 | Resolved: 2024-08-15

## 2024-08-15 PROBLEM — E06.3 HYPOTHYROIDISM DUE TO HASHIMOTO'S THYROIDITIS: Status: ACTIVE | Noted: 2023-03-15

## 2024-08-16 ENCOUNTER — APPOINTMENT (OUTPATIENT)
Dept: FAMILY MEDICINE | Facility: CLINIC | Age: 34
End: 2024-08-16
Payer: COMMERCIAL

## 2024-08-16 VITALS
BODY MASS INDEX: 29.44 KG/M2 | HEIGHT: 62 IN | HEART RATE: 93 BPM | DIASTOLIC BLOOD PRESSURE: 80 MMHG | TEMPERATURE: 97.9 F | WEIGHT: 160 LBS | SYSTOLIC BLOOD PRESSURE: 122 MMHG | OXYGEN SATURATION: 97 %

## 2024-08-16 DIAGNOSIS — K22.10 ULCER OF ESOPHAGUS W/OUT BLEEDING: ICD-10-CM

## 2024-08-16 DIAGNOSIS — M54.9 DORSALGIA, UNSPECIFIED: ICD-10-CM

## 2024-08-16 DIAGNOSIS — R79.82 ELEVATED C-REACTIVE PROTEIN (CRP): ICD-10-CM

## 2024-08-16 DIAGNOSIS — M25.50 PAIN IN UNSPECIFIED JOINT: ICD-10-CM

## 2024-08-16 DIAGNOSIS — M79.18 MYALGIA, OTHER SITE: ICD-10-CM

## 2024-08-16 DIAGNOSIS — E06.3 AUTOIMMUNE THYROIDITIS: ICD-10-CM

## 2024-08-16 DIAGNOSIS — M79.10 MYALGIA, UNSPECIFIED SITE: ICD-10-CM

## 2024-08-16 DIAGNOSIS — K58.9 IRRITABLE BOWEL SYNDROME W/OUT DIARRHEA: ICD-10-CM

## 2024-08-16 DIAGNOSIS — E28.2 POLYCYSTIC OVARIAN SYNDROME: ICD-10-CM

## 2024-08-16 DIAGNOSIS — K76.0 FATTY (CHANGE OF) LIVER, NOT ELSEWHERE CLASSIFIED: ICD-10-CM

## 2024-08-16 PROCEDURE — 99214 OFFICE O/P EST MOD 30 MIN: CPT

## 2024-08-16 PROCEDURE — G2211 COMPLEX E/M VISIT ADD ON: CPT

## 2024-08-16 RX ORDER — DESOGESTREL AND ETHINYL ESTRADIOL 0.15-0.03
0.15-3 KIT ORAL
Refills: 0 | Status: ACTIVE | COMMUNITY

## 2024-08-16 NOTE — HEALTH RISK ASSESSMENT
[0] : 2) Feeling down, depressed, or hopeless: Not at all (0) [PHQ-2 Negative - No further assessment needed] : PHQ-2 Negative - No further assessment needed [Never] : Never [[id number]] : 0

## 2024-08-16 NOTE — HISTORY OF PRESENT ILLNESS
[de-identified] : Sahara has Hashimoto's thyroiditis/hypothyroidism, elevated CRP, h/o erosive esophagitis, IBS, PCOS, HTN, and cotton wool spot L eye (prior rheum eval done at time of spot first being noted was benign/wnl), OA/chronic muscle pain.  Sahara is UTD on follow up and recommended testing with her specialists incl Dr. Betty Camacho (card), Dr. Wei/Yoandy (Endo, [facility]), Dr. Peter (GI), Dr. Marie (rheum)   At her visit 4/2024 ESR (21)/CRP (12, from 6) elevated and H/H wnl and iron levels wnl but low end normal iron/ferritin and goal is ferritin >50 and lower nl ferritin might be contributing to some of her achiness/fatigue etc. , HDL 59, . Recommended Mediterranean more plant based eating and regular exercise and MVI w/ Fe daily +/- iron suppl on any day has menstrual bleeding. Offered she Consider rheum re-eval given persistently elevated ESR/CRP and joint/muscle aches/pains vs defer on rheum for now and see how next labs look. If labs and sxs improving with  eating/exercise/boosting iron levels no need for rheum eval. She opted to see rheum for f/u  She saw Dr. Marie for updated rheum eval Summer 2024. She did not feel she has any s/sxs signif rheum/autoimmune issues. She did not feel she needs to necessarily trend ESR/CRP levels over time unless new inflammatory sxs occur.   She had eval within past year with Dr. Peter re the elevated LFTs. Labs for secondary causes of elevated LFTs were all wnl except for mildly low ferritin (13). Fibroscan imaging fortunately showed no evidence of fibrosis. Testing for SIBO was negative. Is exercising regularly. Eats vegan/pescatarian diet plan. ETOH is at most a few drinks a month  She did labs prior to visit today's visit and results revd as noted below.   She got  in July 2024. She just returned from [facility] . Did a lot of walking. Ate some fish and mainly soy protein while away   Also saw rocio for f/u re her NAFLD. Fasting glucose and A1C were wnl though fasting insulin was mildly elevated. Did trial of Metformin but was not well tolerated (caused acne and breakthrough bleeding). Had Endo f/u and stopped Metformin in 1/2024. They did not feel she needs to consider GLP 1 agonist at this time.  Doing exercise regularly. Shifts seasonally. Mainly walking in Summer. Fall will be [place] classes and walking   Changed birth control due to breakthrough bleeding  and acne and breakthrough bleeding has stopped and acne much improved/resolved since made the change . Changed from 20 mcg estrogen containing pill to a 30 mcg estrogen containing pill. She is a non smoker. We disc make sure hydrates well daily   She notes that medical massage is helpful for her neck/upper back pain/gluteal pain and would like to cont this. Needs new rx  [FreeTextEntry1] : LIVIER PATTEN is a 34 year old female here for a follow up visit.

## 2024-08-16 NOTE — HISTORY OF PRESENT ILLNESS
[FreeTextEntry1] : LIVIER PATTEN is a 34 year old female here for a follow up visit. [de-identified] : Sahara has Hashimoto's thyroiditis/hypothyroidism, elevated CRP, h/o erosive esophagitis, IBS, PCOS, HTN, and cotton wool spot L eye (prior rheum eval done at time of spot first being noted was benign/wnl), OA/chronic muscle pain.  Sahara is UTD on follow up and recommended testing with her specialists incl Dr. Betty Camacho (card), Dr. Wei/Yoandy (Endo, [facility]), Dr. Peter (GI), Dr. Marie (rheum)   At her visit 4/2024 ESR (21)/CRP (12, from 6) elevated and H/H wnl and iron levels wnl but low end normal iron/ferritin and goal is ferritin >50 and lower nl ferritin might be contributing to some of her achiness/fatigue etc. , HDL 59, . Recommended Mediterranean more plant based eating and regular exercise and MVI w/ Fe daily +/- iron suppl on any day has menstrual bleeding. Offered she Consider rheum re-eval given persistently elevated ESR/CRP and joint/muscle aches/pains vs defer on rheum for now and see how next labs look. If labs and sxs improving with  eating/exercise/boosting iron levels no need for rheum eval. She opted to see rheum for f/u  She saw Dr. Marie for updated rheum eval Summer 2024. She did not feel she has any s/sxs signif rheum/autoimmune issues. She did not feel she needs to necessarily trend ESR/CRP levels over time unless new inflammatory sxs occur.   She had eval within past year with Dr. Peter re the elevated LFTs. Labs for secondary causes of elevated LFTs were all wnl except for mildly low ferritin (13). Fibroscan imaging fortunately showed no evidence of fibrosis. Testing for SIBO was negative. Is exercising regularly. Eats vegan/pescatarian diet plan. ETOH is at most a few drinks a month  She did labs prior to visit today's visit and results revd as noted below.   She got  in July 2024. She just returned from [facility] in [facility] . Did a lot of walking. Ate some fish and mainly soy protein while away   Also saw rocio for f/u re her NAFLD. Fasting glucose and A1C were wnl though fasting insulin was mildly elevated. Did trial of Metformin but was not well tolerated (caused acne and breakthrough bleeding). Had Endo f/u and stopped Metformin in 1/2024. They did not feel she needs to consider GLP 1 agonist at this time.  Doing exercise regularly. Shifts seasonally. Mainly walking in Summer. Fall will be [place] classes and walking   Changed birth control due to breakthrough bleeding  and acne and breakthrough bleeding has stopped and acne much improved/resolved since made the change . Changed from 20 mcg estrogen containing pill to a 30 mcg estrogen containing pill. She is a non smoker. We disc make sure hydrates well daily   She notes that medical massage is helpful for her neck/upper back pain/gluteal pain and would like to cont this. Needs new rx

## 2024-08-16 NOTE — PHYSICAL EXAM
[No Acute Distress] : no acute distress [Well Developed] : well developed [Well-Appearing] : well-appearing [Normal Sclera/Conjunctiva] : normal sclera/conjunctiva [EOMI] : extraocular movements intact [Normal Outer Ear/Nose] : the outer ears and nose were normal in appearance [Normal Oropharynx] : the oropharynx was normal [No JVD] : no jugular venous distention [No Lymphadenopathy] : no lymphadenopathy [Supple] : supple [Thyroid Normal, No Nodules] : the thyroid was normal and there were no nodules present [No Respiratory Distress] : no respiratory distress  [No Accessory Muscle Use] : no accessory muscle use [Clear to Auscultation] : lungs were clear to auscultation bilaterally [Normal Rate] : normal rate  [Regular Rhythm] : with a regular rhythm [Normal S1, S2] : normal S1 and S2 [No Murmur] : no murmur heard [No Varicosities] : no varicosities [Pedal Pulses Present] : the pedal pulses are present [No Edema] : there was no peripheral edema [No Extremity Clubbing/Cyanosis] : no extremity clubbing/cyanosis [Soft] : abdomen soft [Non Tender] : non-tender [Non-distended] : non-distended [No Masses] : no abdominal mass palpated [No HSM] : no HSM [Normal Bowel Sounds] : normal bowel sounds [Normal Posterior Cervical Nodes] : no posterior cervical lymphadenopathy [Normal Anterior Cervical Nodes] : no anterior cervical lymphadenopathy [No Joint Swelling] : no joint swelling [Grossly Normal Strength/Tone] : grossly normal strength/tone [No Rash] : no rash [Coordination Grossly Intact] : coordination grossly intact [No Focal Deficits] : no focal deficits [Normal Gait] : normal gait [Normal Affect] : the affect was normal [Normal Insight/Judgement] : insight and judgment were intact [de-identified] : BMI 29, stable from prior  [de-identified] : skin clear today-- no acne

## 2024-08-16 NOTE — PHYSICAL EXAM
[No Acute Distress] : no acute distress [Well Developed] : well developed [Well-Appearing] : well-appearing [Normal Sclera/Conjunctiva] : normal sclera/conjunctiva [EOMI] : extraocular movements intact [Normal Outer Ear/Nose] : the outer ears and nose were normal in appearance [Normal Oropharynx] : the oropharynx was normal [No JVD] : no jugular venous distention [No Lymphadenopathy] : no lymphadenopathy [Supple] : supple [Thyroid Normal, No Nodules] : the thyroid was normal and there were no nodules present [No Respiratory Distress] : no respiratory distress  [No Accessory Muscle Use] : no accessory muscle use [Clear to Auscultation] : lungs were clear to auscultation bilaterally [Normal Rate] : normal rate  [Regular Rhythm] : with a regular rhythm [Normal S1, S2] : normal S1 and S2 [No Murmur] : no murmur heard [No Varicosities] : no varicosities [Pedal Pulses Present] : the pedal pulses are present [No Edema] : there was no peripheral edema [No Extremity Clubbing/Cyanosis] : no extremity clubbing/cyanosis [Soft] : abdomen soft [Non Tender] : non-tender [Non-distended] : non-distended [No Masses] : no abdominal mass palpated [No HSM] : no HSM [Normal Bowel Sounds] : normal bowel sounds [Normal Posterior Cervical Nodes] : no posterior cervical lymphadenopathy [Normal Anterior Cervical Nodes] : no anterior cervical lymphadenopathy [No Joint Swelling] : no joint swelling [Grossly Normal Strength/Tone] : grossly normal strength/tone [No Rash] : no rash [Coordination Grossly Intact] : coordination grossly intact [No Focal Deficits] : no focal deficits [Normal Gait] : normal gait [Normal Affect] : the affect was normal [Normal Insight/Judgement] : insight and judgment were intact [de-identified] : BMI 29, stable from prior  [de-identified] : skin clear today-- no acne

## 2024-08-16 NOTE — ASSESSMENT
[FreeTextEntry1] : SAHARA PATTEN is a 34 year old female here to follow up on medical issues as noted above.  Sahara has Hashimoto's thyroiditis/hypothyroidism, elevated CRP, h/o erosive esophagitis, IBS, PCOS, HTN, and cotton wool spot L eye (prior rheum eval done at time of spot first being noted was benign/wnl), OA/chronic muscle pain.

## 2024-08-16 NOTE — REVIEW OF SYSTEMS
[Fever] : no fever [Chills] : no chills [Fatigue] : no fatigue [Night Sweats] : night sweats [Abdominal Pain] : abdominal pain [Constipation] : constipation [Heartburn] : heartburn [Joint Pain] : joint pain [Muscle Pain] : muscle pain [Negative] : Heme/Lymph [FreeTextEntry2] : occas night sweats (around 3x/wk), had CBC with endo 1/024 and was wnl, no other sxs, she is not concerned about these at this time, Will let me know if signif change in freq o fsxs or any addtl sxs [Lower Ext Edema] : no lower extremity edema [Diarrhea] : diarrhea [Vomiting] : no vomiting [Melena] : no melena [Joint Stiffness] : no joint stiffness [Back Pain] : back pain [FreeTextEntry7] : IBS-C with bloating, seeing Dr. Peter; had colonoscopy/EGD and small bowel eval in 2022 and all benign, Has hemorrhoids (int and ext), occas bleeding, occas discomfort, uses OTC meds ; getting heartburn a couple times a week, tried Pantoprazole in the past though did not love it, takes Tums occas and we disc can try H2 blocker prn, also hemorrhoids, has carlos CR surg in past and no intervention was needed, sxs not too bothersome so does not feel need to go back to CR at this time [FreeTextEntry9] : see HPI, medical massage helps, also exercises regularly

## 2024-08-16 NOTE — PLAN
[FreeTextEntry1] : Continue all medications as prescribed. Labs done prior to visit reviewed with Sahara today. CBC, CMP wnl.  (201),  (159), HDL 63 (59),  (114).   BP goal is <=135/85 on average on home checks. Advised to let us know if BPs are above goal on home checks.  Lifestyle measures to optimize BP reviewed, including regular exercise, adequate sleep, Mediterranean or DASH style clean eating, mindfulness/meditation, magnesium 100-400 mg supplementation, avoid NSAIDS, stress management techniques etc.  LDL goal <=100 ideally. Reviewed LDL goal and ASCVD risk estimate and factors that go into this calculation.  Reviewed risks/benefits of statin med. Reviewed red yeast rice RYR (600-1200 mg daily) risks/benefits as an alternative to statin meds if not ready to consider statin meds. Recommended excellent hydration +/- co Q10 (100-400 mg daily) to decrease risk for statin (or RYR) related myalgias.   Based on most recent LDL level statin therapy is not indicated at this time. Consider use of psyllium husk fiber supplement to help lower LDL, keep TG wnl and will also help with appetite control and wgt loss over time.   LFTs improved and wnl. Reviewed fatty liver causes and potential complications. Reviewed that eating cleanly (e.g. Mediterranean plant based eating), weight loss, regular exercise, avoidance of processed foods and sweetened beverages, avoidance of alcohol, keeping cholesterol, glucose and A1C levels optimized will all help to improve fatty liver and reduce risk for complications of fatty liver including fibrosis and cirrhosis. Follow up with GI as recommended by them.   Discussed clean eating (eg Mediterranean style plant based eating plan recommended) and regular exercise/staying as physically active as possible. Include balance exercises and strength training and core strengthening exercises for bone health and to decrease risk for falls.  IBS sxs, etiology, triggers, treatments reviewed. Try to id and avoid any food triggers. Eat cleanly (Mediterranean style eating plan, probiotic and prebiotic rich foods), stress reduction/mindfulness techniques, can try fiber supplement to see if helps. Can consider adding low dose SSRI med if needed to help with symptom management. See GI for further eval if incr/new symptoms or if symptoms are not improved with these measures or if ever any alarm symptoms (eg blood in stools, severe abdominal pain, unexplained fevers or weight loss etc).  Recommended Tylenol XS or Arthritis 1-2 pills BID-TID if helpful, ok to use NSAIDs sparingly with food (but revd r/b/se of NSAIDs incl CV, renal and GI and she should limit use as much as possible), regular stretching, heat/ice prn, consider turmeric supplementation, consider CBD cream or oral options, gentle yoga/chair yoga, Pilates, strengthen core muscles, consider chiro and/or massage and/or acupuncture. If these measures are not helpful enough then consider consultation with ortho and/or pain  for further eval and treatment.  Reviewed importance of good self care (e.g. meditation, yoga, adequate rest, regular exercise, magnesium, clean eating, etc.).  Follow up with specialists as recommended by them. I asked Sahara to have her endo send info as nothing recd. Appreciate card and GI notes/test results  Follow up for CPE and RPA (HTN) visit in about 6 months and repeat fasting labs at that time.  Time spent immediately before and after, as well as Face-to-face time spent during visit with patient, over half in discussion of the above diagnoses and treatment plan: 30 minutes.

## 2024-08-16 NOTE — HEALTH RISK ASSESSMENT
Summary:    Patient is due/failing the following:   BP CHECK and LDL    Action needed:   Patient needs nurse only appointment.    Type of outreach:    Sent letter.    Questions for provider review:    None                                                                                                                                    Maribel Payne       Chart routed to Care Team .          Panel Management Review      Patient has the following on his problem list:     Hypertension   Last three blood pressure readings:  BP Readings from Last 3 Encounters:   11/30/17 (!) 169/92     Blood pressure: FAILED    HTN Guidelines:  Age 18-59 BP range:  Less than 140/90  Age 60-85 with Diabetes:  Less than 140/90  Age 60-85 without Diabetes:  less than 150/90      Composite cancer screening  Chart review shows that this patient is due/due soon for the following Colonoscopy patient will check insurance and call on own time   [0] : 2) Feeling down, depressed, or hopeless: Not at all (0) [PHQ-2 Negative - No further assessment needed] : PHQ-2 Negative - No further assessment needed [Never] : Never [[id number]] : 0

## 2024-08-20 ENCOUNTER — NON-APPOINTMENT (OUTPATIENT)
Age: 34
End: 2024-08-20

## 2024-08-20 ENCOUNTER — APPOINTMENT (OUTPATIENT)
Dept: CARDIOLOGY | Facility: CLINIC | Age: 34
End: 2024-08-20
Payer: COMMERCIAL

## 2024-08-20 VITALS
HEART RATE: 83 BPM | HEIGHT: 62 IN | DIASTOLIC BLOOD PRESSURE: 66 MMHG | BODY MASS INDEX: 29.44 KG/M2 | SYSTOLIC BLOOD PRESSURE: 110 MMHG | WEIGHT: 160 LBS | OXYGEN SATURATION: 99 %

## 2024-08-20 DIAGNOSIS — I10 ESSENTIAL (PRIMARY) HYPERTENSION: ICD-10-CM

## 2024-08-20 DIAGNOSIS — E78.5 HYPERLIPIDEMIA, UNSPECIFIED: ICD-10-CM

## 2024-08-20 PROCEDURE — 99213 OFFICE O/P EST LOW 20 MIN: CPT | Mod: 25

## 2024-08-20 PROCEDURE — 93000 ELECTROCARDIOGRAM COMPLETE: CPT

## 2024-08-20 NOTE — DISCUSSION/SUMMARY
[EKG obtained to assist in diagnosis and management of assessed problem(s)] : EKG obtained to assist in diagnosis and management of assessed problem(s) [FreeTextEntry1] : Pt is a 35 y/o F with HTN, HLD  HTN:  well controlled c/w losartan 50mg qd TTE 10/2019 EF 75%, mild TR TTE 04/2021 EF 67%, mild TR VINNY 04/2021 no stenosis Advised low salt diet, regular exercise recent labs reviewed  HLD: Advise lifestyle modifications   Pt will return in 12 mnths or sooner as needed but I encouraged communication via phone or portal if necessary.  The described plan was discussed with the pt.  All questions and concerns were addressed to the best of my knowledge.

## 2024-08-20 NOTE — HISTORY OF PRESENT ILLNESS
[FreeTextEntry1] : Pt is a 35 y/o F who presents today for f/u.  She has PMH HTN, Hashimoto now on levothyroxine.  She has been on OCP since age 18 - she tried to come off OCPs but her PCOS symptoms were bad and so she has restarted OCP.   Chlorthalidone caused hypokalemia - changed to losartan  She states that she is feeling well and has no cardiac complaints - denies CP, SOB, diaphoresis, palpitations, dizziness, syncope, LE edema, PND, orthopnea, flushing.  Home 's/80's  TTE 10/2019 EF 75%, mild TR TTE 04/2021 EF 67%, mild TR VINNY 04/2021 no stenosis  amlodipine caused constipation  PMH: PCOS, latent TB, cotton wool spot, Hashimoto PCP [facility] gyn Dr Chi Family hx: mother HLD Smoking status: never social ETOH no drug use Current exercise: 4-5x/week cardio, resistance training Daily water intake: 60 oz Daily caffeine intake: 1 cup black tea OTC medications: probiotic, melatonin NKDA Previous hospitalizations: none 0 children

## 2024-08-30 ENCOUNTER — TRANSCRIPTION ENCOUNTER (OUTPATIENT)
Age: 34
End: 2024-08-30

## 2024-09-05 ENCOUNTER — TRANSCRIPTION ENCOUNTER (OUTPATIENT)
Age: 34
End: 2024-09-05

## 2025-01-06 ENCOUNTER — TRANSCRIPTION ENCOUNTER (OUTPATIENT)
Age: 35
End: 2025-01-06

## 2025-01-08 ENCOUNTER — NON-APPOINTMENT (OUTPATIENT)
Age: 35
End: 2025-01-08

## 2025-02-21 ENCOUNTER — OUTPATIENT (OUTPATIENT)
Dept: OUTPATIENT SERVICES | Facility: HOSPITAL | Age: 35
LOS: 1 days | End: 2025-02-21
Payer: COMMERCIAL

## 2025-02-21 ENCOUNTER — APPOINTMENT (OUTPATIENT)
Dept: MRI IMAGING | Facility: HOSPITAL | Age: 35
End: 2025-02-21

## 2025-02-21 ENCOUNTER — APPOINTMENT (OUTPATIENT)
Dept: RADIOLOGY | Facility: HOSPITAL | Age: 35
End: 2025-02-21

## 2025-02-21 DIAGNOSIS — R19.4 CHANGE IN BOWEL HABIT: ICD-10-CM

## 2025-02-21 DIAGNOSIS — K76.0 FATTY (CHANGE OF) LIVER, NOT ELSEWHERE CLASSIFIED: ICD-10-CM

## 2025-02-21 DIAGNOSIS — R19.7 DIARRHEA, UNSPECIFIED: ICD-10-CM

## 2025-02-21 DIAGNOSIS — R79.82 ELEVATED C-REACTIVE PROTEIN (CRP): ICD-10-CM

## 2025-02-21 DIAGNOSIS — K59.00 CONSTIPATION, UNSPECIFIED: ICD-10-CM

## 2025-02-21 PROCEDURE — 72197 MRI PELVIS W/O & W/DYE: CPT

## 2025-02-21 PROCEDURE — 74183 MRI ABD W/O CNTR FLWD CNTR: CPT | Mod: 26

## 2025-02-21 PROCEDURE — A9579: CPT

## 2025-02-21 PROCEDURE — 72197 MRI PELVIS W/O & W/DYE: CPT | Mod: 26

## 2025-02-21 PROCEDURE — 74183 MRI ABD W/O CNTR FLWD CNTR: CPT

## 2025-04-10 ENCOUNTER — APPOINTMENT (OUTPATIENT)
Dept: FAMILY MEDICINE | Facility: CLINIC | Age: 35
End: 2025-04-10
Payer: COMMERCIAL

## 2025-04-10 VITALS
HEIGHT: 62 IN | SYSTOLIC BLOOD PRESSURE: 118 MMHG | DIASTOLIC BLOOD PRESSURE: 70 MMHG | WEIGHT: 158 LBS | HEART RATE: 86 BPM | BODY MASS INDEX: 29.08 KG/M2 | TEMPERATURE: 97.2 F | OXYGEN SATURATION: 100 %

## 2025-04-10 DIAGNOSIS — Z83.3 FAMILY HISTORY OF DIABETES MELLITUS: ICD-10-CM

## 2025-04-10 DIAGNOSIS — H35.81 RETINAL EDEMA: ICD-10-CM

## 2025-04-10 DIAGNOSIS — K76.0 FATTY (CHANGE OF) LIVER, NOT ELSEWHERE CLASSIFIED: ICD-10-CM

## 2025-04-10 DIAGNOSIS — E28.2 POLYCYSTIC OVARIAN SYNDROME: ICD-10-CM

## 2025-04-10 DIAGNOSIS — E06.3 AUTOIMMUNE THYROIDITIS: ICD-10-CM

## 2025-04-10 DIAGNOSIS — Z00.00 ENCOUNTER FOR GENERAL ADULT MEDICAL EXAMINATION W/OUT ABNORMAL FINDINGS: ICD-10-CM

## 2025-04-10 DIAGNOSIS — I10 ESSENTIAL (PRIMARY) HYPERTENSION: ICD-10-CM

## 2025-04-10 DIAGNOSIS — E78.5 HYPERLIPIDEMIA, UNSPECIFIED: ICD-10-CM

## 2025-04-10 PROCEDURE — 99395 PREV VISIT EST AGE 18-39: CPT

## 2025-04-10 RX ORDER — FAMOTIDINE 40 MG/1
40 TABLET, FILM COATED ORAL
Refills: 0 | Status: ACTIVE | COMMUNITY

## 2025-04-29 ENCOUNTER — TRANSCRIPTION ENCOUNTER (OUTPATIENT)
Age: 35
End: 2025-04-29

## 2025-05-08 ENCOUNTER — APPOINTMENT (OUTPATIENT)
Dept: COLORECTAL SURGERY | Facility: CLINIC | Age: 35
End: 2025-05-08
Payer: COMMERCIAL

## 2025-05-08 VITALS
SYSTOLIC BLOOD PRESSURE: 127 MMHG | OXYGEN SATURATION: 98 % | RESPIRATION RATE: 15 BRPM | HEART RATE: 87 BPM | DIASTOLIC BLOOD PRESSURE: 84 MMHG

## 2025-05-08 VITALS
SYSTOLIC BLOOD PRESSURE: 88 MMHG | BODY MASS INDEX: 27.6 KG/M2 | DIASTOLIC BLOOD PRESSURE: 63 MMHG | WEIGHT: 150 LBS | HEART RATE: 73 BPM | RESPIRATION RATE: 17 BRPM | HEIGHT: 62 IN | OXYGEN SATURATION: 99 %

## 2025-05-08 DIAGNOSIS — K64.4 RESIDUAL HEMORRHOIDAL SKIN TAGS: ICD-10-CM

## 2025-05-08 DIAGNOSIS — K60.1 CHRONIC ANAL FISSURE: ICD-10-CM

## 2025-05-08 PROCEDURE — 99244 OFF/OP CNSLTJ NEW/EST MOD 40: CPT

## 2025-05-30 ENCOUNTER — APPOINTMENT (OUTPATIENT)
Dept: COLORECTAL SURGERY | Facility: CLINIC | Age: 35
End: 2025-05-30

## 2025-07-24 ENCOUNTER — TRANSCRIPTION ENCOUNTER (OUTPATIENT)
Age: 35
End: 2025-07-24

## 2025-08-19 ENCOUNTER — RX RENEWAL (OUTPATIENT)
Age: 35
End: 2025-08-19

## 2025-08-20 ENCOUNTER — APPOINTMENT (OUTPATIENT)
Dept: CARDIOLOGY | Facility: CLINIC | Age: 35
End: 2025-08-20
Payer: COMMERCIAL

## 2025-08-20 VITALS
HEIGHT: 62 IN | SYSTOLIC BLOOD PRESSURE: 130 MMHG | HEART RATE: 78 BPM | DIASTOLIC BLOOD PRESSURE: 70 MMHG | OXYGEN SATURATION: 98 % | BODY MASS INDEX: 29.44 KG/M2 | WEIGHT: 160 LBS

## 2025-08-20 DIAGNOSIS — I10 ESSENTIAL (PRIMARY) HYPERTENSION: ICD-10-CM

## 2025-08-20 DIAGNOSIS — E78.5 HYPERLIPIDEMIA, UNSPECIFIED: ICD-10-CM

## 2025-08-20 PROCEDURE — 93000 ELECTROCARDIOGRAM COMPLETE: CPT

## 2025-08-20 PROCEDURE — 99213 OFFICE O/P EST LOW 20 MIN: CPT | Mod: 25
